# Patient Record
Sex: MALE | Race: ASIAN | NOT HISPANIC OR LATINO | Employment: FULL TIME | URBAN - METROPOLITAN AREA
[De-identification: names, ages, dates, MRNs, and addresses within clinical notes are randomized per-mention and may not be internally consistent; named-entity substitution may affect disease eponyms.]

---

## 2023-01-10 ENCOUNTER — OFFICE VISIT (OUTPATIENT)
Dept: PRIMARY CARE CLINIC | Facility: CLINIC | Age: 38
End: 2023-01-10
Payer: COMMERCIAL

## 2023-01-10 VITALS
OXYGEN SATURATION: 97 % | HEIGHT: 68 IN | DIASTOLIC BLOOD PRESSURE: 54 MMHG | RESPIRATION RATE: 16 BRPM | SYSTOLIC BLOOD PRESSURE: 104 MMHG | TEMPERATURE: 97 F | HEART RATE: 59 BPM | WEIGHT: 182.63 LBS | BODY MASS INDEX: 27.68 KG/M2

## 2023-01-10 DIAGNOSIS — R94.31 ABNORMAL EKG: ICD-10-CM

## 2023-01-10 DIAGNOSIS — Z11.4 ENCOUNTER FOR SCREENING FOR HIV: ICD-10-CM

## 2023-01-10 DIAGNOSIS — F41.1 GAD (GENERALIZED ANXIETY DISORDER): ICD-10-CM

## 2023-01-10 DIAGNOSIS — Z11.59 NEED FOR HEPATITIS C SCREENING TEST: ICD-10-CM

## 2023-01-10 DIAGNOSIS — Z83.3 FAMILY HISTORY OF DIABETES MELLITUS (DM): ICD-10-CM

## 2023-01-10 DIAGNOSIS — Z23 NEED FOR TD VACCINE: ICD-10-CM

## 2023-01-10 DIAGNOSIS — Z76.89 ENCOUNTER TO ESTABLISH CARE: Primary | ICD-10-CM

## 2023-01-10 DIAGNOSIS — Z13.220 SCREENING CHOLESTEROL LEVEL: ICD-10-CM

## 2023-01-10 DIAGNOSIS — J06.9 UPPER RESPIRATORY TRACT INFECTION, UNSPECIFIED TYPE: ICD-10-CM

## 2023-01-10 DIAGNOSIS — Z00.00 HEALTH CARE MAINTENANCE: ICD-10-CM

## 2023-01-10 DIAGNOSIS — Z23 FLU VACCINE NEED: ICD-10-CM

## 2023-01-10 PROCEDURE — 1160F PR REVIEW ALL MEDS BY PRESCRIBER/CLIN PHARMACIST DOCUMENTED: ICD-10-PCS | Mod: CPTII,S$GLB,, | Performed by: STUDENT IN AN ORGANIZED HEALTH CARE EDUCATION/TRAINING PROGRAM

## 2023-01-10 PROCEDURE — 3074F SYST BP LT 130 MM HG: CPT | Mod: CPTII,S$GLB,, | Performed by: STUDENT IN AN ORGANIZED HEALTH CARE EDUCATION/TRAINING PROGRAM

## 2023-01-10 PROCEDURE — 99999 PR PBB SHADOW E&M-EST. PATIENT-LVL V: CPT | Mod: PBBFAC,,, | Performed by: STUDENT IN AN ORGANIZED HEALTH CARE EDUCATION/TRAINING PROGRAM

## 2023-01-10 PROCEDURE — 99999 PR PBB SHADOW E&M-EST. PATIENT-LVL V: ICD-10-PCS | Mod: PBBFAC,,, | Performed by: STUDENT IN AN ORGANIZED HEALTH CARE EDUCATION/TRAINING PROGRAM

## 2023-01-10 PROCEDURE — 3074F PR MOST RECENT SYSTOLIC BLOOD PRESSURE < 130 MM HG: ICD-10-PCS | Mod: CPTII,S$GLB,, | Performed by: STUDENT IN AN ORGANIZED HEALTH CARE EDUCATION/TRAINING PROGRAM

## 2023-01-10 PROCEDURE — 1159F MED LIST DOCD IN RCRD: CPT | Mod: CPTII,S$GLB,, | Performed by: STUDENT IN AN ORGANIZED HEALTH CARE EDUCATION/TRAINING PROGRAM

## 2023-01-10 PROCEDURE — 1159F PR MEDICATION LIST DOCUMENTED IN MEDICAL RECORD: ICD-10-PCS | Mod: CPTII,S$GLB,, | Performed by: STUDENT IN AN ORGANIZED HEALTH CARE EDUCATION/TRAINING PROGRAM

## 2023-01-10 PROCEDURE — 99204 PR OFFICE/OUTPT VISIT, NEW, LEVL IV, 45-59 MIN: ICD-10-PCS | Mod: S$GLB,,, | Performed by: STUDENT IN AN ORGANIZED HEALTH CARE EDUCATION/TRAINING PROGRAM

## 2023-01-10 PROCEDURE — 3008F PR BODY MASS INDEX (BMI) DOCUMENTED: ICD-10-PCS | Mod: CPTII,S$GLB,, | Performed by: STUDENT IN AN ORGANIZED HEALTH CARE EDUCATION/TRAINING PROGRAM

## 2023-01-10 PROCEDURE — 3078F DIAST BP <80 MM HG: CPT | Mod: CPTII,S$GLB,, | Performed by: STUDENT IN AN ORGANIZED HEALTH CARE EDUCATION/TRAINING PROGRAM

## 2023-01-10 PROCEDURE — 1160F RVW MEDS BY RX/DR IN RCRD: CPT | Mod: CPTII,S$GLB,, | Performed by: STUDENT IN AN ORGANIZED HEALTH CARE EDUCATION/TRAINING PROGRAM

## 2023-01-10 PROCEDURE — 3078F PR MOST RECENT DIASTOLIC BLOOD PRESSURE < 80 MM HG: ICD-10-PCS | Mod: CPTII,S$GLB,, | Performed by: STUDENT IN AN ORGANIZED HEALTH CARE EDUCATION/TRAINING PROGRAM

## 2023-01-10 PROCEDURE — 99204 OFFICE O/P NEW MOD 45 MIN: CPT | Mod: S$GLB,,, | Performed by: STUDENT IN AN ORGANIZED HEALTH CARE EDUCATION/TRAINING PROGRAM

## 2023-01-10 PROCEDURE — 3008F BODY MASS INDEX DOCD: CPT | Mod: CPTII,S$GLB,, | Performed by: STUDENT IN AN ORGANIZED HEALTH CARE EDUCATION/TRAINING PROGRAM

## 2023-01-10 RX ORDER — AZITHROMYCIN 250 MG/1
TABLET, FILM COATED ORAL
Qty: 6 TABLET | Refills: 0 | Status: SHIPPED | OUTPATIENT
Start: 2023-01-10 | End: 2023-01-15

## 2023-01-10 NOTE — PATIENT INSTRUCTIONS
Establish care:   - 37-year-old male presenting to clinic today to establish care.  Has some history of mild tremor as well as anxiety.  Family history of diabetes and parkinsonism    URI:   - patient was initially not complaining of upper respiratory symptoms, however on exam was found to have redness in the left ear, had palpable lymph node in the left anterior chain in left posterior auricular chain.   - mild redness to oropharynx, no pustules.   - getting COVID and flu swabs completed.   - discussed that this is likely not bacterial in would not need antibiotics at this time.  But will give pocket prescription to use if symptoms get significantly worse in the next week.    General anxiety disorder:   - patient's SHERLEY score 7.   - does note some stress and anxiety at baseline.   - place a referral to psych/Psychology to get counseling.   - discussed resting, hydration, exercise and general wellness.    Abnormal EKG:   - patient has EKG today showing sinus bradycardia, and possible pericarditis.  Will review this and see if Echo is indicated after having phizer shots 3 times this year and some intermittent chest pains.    - will get Troponin and CRP.     Tremor:   - patient has mild tremor in hand, noted when watching closely, but is not apparent from a distance.   - most noted at rest.  Does suspected has some improvement with alcoholic use.   - wife notices while holding patient's hand.   - has not seem to get worse with exertion or activity.   - discussed relaxation, rest and hydration.   - could consider beta-blocker in the future if needed.    Family history:   - family history of parkinsonism, early stroke and diabetes.  Will get EKG, cholesterol, A1c.

## 2023-01-10 NOTE — PROGRESS NOTES
Subjective:           Patient ID: Jose Frost is a 37 y.o. male who presents today with a chief complaint of est care.    Chief Complaint:   Establish Care and Annual Exam      History of Present Illness:    37-year-old male presenting to establish care.  Works at  at [employer].      Has tremor in his hands, has been for at least 10 years.    Has seen that a drink can possibly help with tremor.     Has had a thyroid panel was normal in [place].     Has some increased anxiety at times.    Has some snoring at night.   Has difficulty breathing through the nose, mouth breathing at night.     GAD7 was at a 7.    Is interested in counseling.     Review of Systems   Constitutional: Negative.  Negative for chills, fatigue, fever and unexpected weight change.   HENT: Negative.  Negative for congestion, postnasal drip, rhinorrhea and trouble swallowing.    Eyes: Negative.    Respiratory: Negative.  Negative for cough, chest tightness, shortness of breath and wheezing.    Cardiovascular: Negative.  Negative for chest pain, palpitations and leg swelling.   Gastrointestinal: Negative.  Negative for constipation, diarrhea, nausea and vomiting.   Endocrine: Negative.    Genitourinary: Negative.  Negative for difficulty urinating, frequency and urgency.   Musculoskeletal: Negative.  Negative for arthralgias, back pain and gait problem.   Skin: Negative.  Negative for rash and wound.   Allergic/Immunologic: Negative for food allergies.   Neurological:  Positive for tremors. Negative for headaches.   Psychiatric/Behavioral:  Positive for sleep disturbance (snoring). Negative for behavioral problems and decreased concentration. The patient is nervous/anxious.          Objective:        Vitals:    01/10/23 1341 01/10/23 1431   BP: (!) 100/58 (!) 104/54   BP Location: Right arm Right arm   Patient Position: Sitting Sitting   BP Method: Medium (Manual) Medium (Manual)   Pulse: (!) 59    Resp: 16    Temp: 97.2  "°F (36.2 °C)    TempSrc: Temporal    SpO2: 97%    Weight: 82.9 kg (182 lb 10.4 oz)    Height: 5' 8" (1.727 m)        Body mass index is 27.77 kg/m².      Physical Exam  Vitals reviewed.   Constitutional:       General: He is not in acute distress.     Appearance: Normal appearance. He is not ill-appearing.      Comments: As per BMI.   HENT:      Head: Normocephalic and atraumatic.      Right Ear: External ear normal.      Left Ear: External ear normal.      Ears:      Comments: Redness in left ear canal.     Nose: Nose normal. No rhinorrhea.      Mouth/Throat:      Pharynx: Posterior oropharyngeal erythema (mild) present. No oropharyngeal exudate.   Eyes:      Extraocular Movements: Extraocular movements intact.      Conjunctiva/sclera: Conjunctivae normal.   Cardiovascular:      Rate and Rhythm: Regular rhythm. Bradycardia present.      Heart sounds: No murmur heard.  Pulmonary:      Effort: Pulmonary effort is normal. No respiratory distress.      Breath sounds: No wheezing.   Abdominal:      Tenderness: There is no right CVA tenderness or left CVA tenderness.   Musculoskeletal:         General: No swelling or deformity.      Cervical back: Normal range of motion.      Right lower leg: No edema.      Left lower leg: No edema.   Lymphadenopathy:      Cervical: Cervical adenopathy present.   Skin:     Capillary Refill: Capillary refill takes less than 2 seconds.      Findings: No bruising or lesion.   Neurological:      General: No focal deficit present.      Mental Status: He is alert and oriented to person, place, and time.      Gait: Gait normal.      Comments: Mild tremor at rest. No cog wheeling.    Psychiatric:         Thought Content: Thought content normal.         Judgment: Judgment normal.      Comments: Some anxiety voiced.           No results found for: NA, K, CL, CO2, BUN, CREATININE, GLUCOSE, ANIONGAP  No results found for: HGBA1C  No results found for: BNP, BNPTRIAGEBLO    No results found for: WBC, " HGB, HCT, PLT, GRAN  No results found for: CHOL, HDL, LDLCALC, TRIG       Current Outpatient Medications:     azithromycin (Z-SCOTT) 250 MG tablet, Take 2 tablets by mouth on day 1; Take 1 tablet by mouth on days 2-5, Disp: 6 tablet, Rfl: 0     Outpatient Encounter Medications as of 1/10/2023   Medication Sig Dispense Refill    azithromycin (Z-SCOTT) 250 MG tablet Take 2 tablets by mouth on day 1; Take 1 tablet by mouth on days 2-5 6 tablet 0     No facility-administered encounter medications on file as of 1/10/2023.          Assessment:       1. Encounter to establish care    2. Family history of diabetes mellitus (DM)    3. SHERLEY (generalized anxiety disorder)    4. Health care maintenance    5. Need for Td vaccine    6. Flu vaccine need    7. Need for hepatitis C screening test    8. Encounter for screening for HIV    9. Screening cholesterol level    10. Upper respiratory tract infection, unspecified type           Plan:       Encounter to establish care    Family history of diabetes mellitus (DM)  -     Hemoglobin A1C; Future; Expected date: 01/10/2023    SHERLEY (generalized anxiety disorder)  -     Ambulatory referral/consult to Psychology; Future; Expected date: 01/17/2023  -     Ambulatory referral/consult to Psychiatry; Future; Expected date: 01/17/2023    Health care maintenance  -     CBC Auto Differential; Future; Expected date: 01/10/2023  -     Comprehensive Metabolic Panel; Future; Expected date: 01/10/2023  -     Lipid Panel; Future; Expected date: 01/10/2023  -     TSH; Future; Expected date: 01/10/2023  -     T4, Free; Future; Expected date: 01/10/2023    Need for Td vaccine  -     Cancel: (In Office Administered) Td Vaccine - Preservative Free    Flu vaccine need  -     Cancel: Influenza - Quadrivalent *Preferred* (6 months+) (PF)    Need for hepatitis C screening test  -     HEPATITIS C ANTIBODY; Future; Expected date: 01/10/2023    Encounter for screening for HIV  -     HIV 1/2 Ag/Ab (4th Gen); Future;  Expected date: 01/10/2023    Screening cholesterol level    Upper respiratory tract infection, unspecified type  -     POCT COVID-19 Rapid Screening  -     POCT Influenza A/B Molecular  -     azithromycin (Z-SCOTT) 250 MG tablet; Take 2 tablets by mouth on day 1; Take 1 tablet by mouth on days 2-5  Dispense: 6 tablet; Refill: 0                 Establish care:   - 37-year-old male presenting to clinic today to establish care.  Has some history of mild tremor as well as anxiety.  Family history of diabetes and parkinsonism    URI:   - patient was initially not complaining of upper respiratory symptoms, however on exam was found to have redness in the left ear, had palpable lymph node in the left anterior chain in left posterior auricular chain.   - mild redness to oropharynx, no pustules.   - getting COVID and flu swabs completed.   - discussed that this is likely not bacterial in would not need antibiotics at this time.  But will give pocket prescription to use if symptoms get significantly worse in the next week.    General anxiety disorder:   - patient's SHERLEY score 7.   - does note some stress and anxiety at baseline.   - place a referral to psych/Psychology to get counseling.   - discussed resting, hydration, exercise and general wellness.    Abnormal EKG:   - patient has EKG today showing sinus bradycardia, and possible pericarditis.  Will review this and see if Echo is indicated after having phizer shots 3 times this year and some intermittent chest pains.    - will get Troponin and CRP.     Tremor:   - patient has mild tremor in hand, noted when watching closely, but is not apparent from a distance.   - most noted at rest.  Does suspected has some improvement with alcoholic use.   - wife notices while holding patient's hand.   - has not seem to get worse with exertion or activity.   - discussed relaxation, rest and hydration.   - could consider beta-blocker in the future if needed.    Family history:   - family  history of parkinsonism, early stroke and diabetes.  Will get EKG, cholesterol, A1c.

## 2023-01-11 ENCOUNTER — PATIENT MESSAGE (OUTPATIENT)
Dept: DIABETES | Facility: CLINIC | Age: 38
End: 2023-01-11
Payer: COMMERCIAL

## 2023-01-17 ENCOUNTER — LAB VISIT (OUTPATIENT)
Dept: LAB | Facility: HOSPITAL | Age: 38
End: 2023-01-17
Attending: STUDENT IN AN ORGANIZED HEALTH CARE EDUCATION/TRAINING PROGRAM
Payer: COMMERCIAL

## 2023-01-17 DIAGNOSIS — Z00.00 HEALTH CARE MAINTENANCE: ICD-10-CM

## 2023-01-17 DIAGNOSIS — R94.31 ABNORMAL EKG: ICD-10-CM

## 2023-01-17 DIAGNOSIS — Z11.4 ENCOUNTER FOR SCREENING FOR HIV: ICD-10-CM

## 2023-01-17 DIAGNOSIS — Z83.3 FAMILY HISTORY OF DIABETES MELLITUS (DM): ICD-10-CM

## 2023-01-17 DIAGNOSIS — Z11.59 NEED FOR HEPATITIS C SCREENING TEST: ICD-10-CM

## 2023-01-17 LAB
ALBUMIN SERPL BCP-MCNC: 4.4 G/DL (ref 3.5–5.2)
ALP SERPL-CCNC: 70 U/L (ref 55–135)
ALT SERPL W/O P-5'-P-CCNC: 23 U/L (ref 10–44)
ANION GAP SERPL CALC-SCNC: 9 MMOL/L (ref 8–16)
AST SERPL-CCNC: 26 U/L (ref 10–40)
BASOPHILS # BLD AUTO: 0.05 K/UL (ref 0–0.2)
BASOPHILS NFR BLD: 0.9 % (ref 0–1.9)
BILIRUB SERPL-MCNC: 0.6 MG/DL (ref 0.1–1)
BUN SERPL-MCNC: 18 MG/DL (ref 6–20)
CALCIUM SERPL-MCNC: 10.3 MG/DL (ref 8.7–10.5)
CHLORIDE SERPL-SCNC: 103 MMOL/L (ref 95–110)
CHOLEST SERPL-MCNC: 207 MG/DL (ref 120–199)
CHOLEST/HDLC SERPL: 3.9 {RATIO} (ref 2–5)
CO2 SERPL-SCNC: 25 MMOL/L (ref 23–29)
CREAT SERPL-MCNC: 0.9 MG/DL (ref 0.5–1.4)
CRP SERPL-MCNC: 5.3 MG/L (ref 0–8.2)
DIFFERENTIAL METHOD: ABNORMAL
EOSINOPHIL # BLD AUTO: 0.1 K/UL (ref 0–0.5)
EOSINOPHIL NFR BLD: 2 % (ref 0–8)
ERYTHROCYTE [DISTWIDTH] IN BLOOD BY AUTOMATED COUNT: 13.1 % (ref 11.5–14.5)
EST. GFR  (NO RACE VARIABLE): >60 ML/MIN/1.73 M^2
ESTIMATED AVG GLUCOSE: 108 MG/DL (ref 68–131)
GLUCOSE SERPL-MCNC: 82 MG/DL (ref 70–110)
HBA1C MFR BLD: 5.4 % (ref 4–5.6)
HCT VFR BLD AUTO: 42.8 % (ref 40–54)
HCV AB SERPL QL IA: NORMAL
HDLC SERPL-MCNC: 53 MG/DL (ref 40–75)
HDLC SERPL: 25.6 % (ref 20–50)
HGB BLD-MCNC: 13.3 G/DL (ref 14–18)
HIV 1+2 AB+HIV1 P24 AG SERPL QL IA: NORMAL
IMM GRANULOCYTES # BLD AUTO: 0.01 K/UL (ref 0–0.04)
IMM GRANULOCYTES NFR BLD AUTO: 0.2 % (ref 0–0.5)
LDLC SERPL CALC-MCNC: 141.6 MG/DL (ref 63–159)
LYMPHOCYTES # BLD AUTO: 1.7 K/UL (ref 1–4.8)
LYMPHOCYTES NFR BLD: 31.9 % (ref 18–48)
MCH RBC QN AUTO: 27 PG (ref 27–31)
MCHC RBC AUTO-ENTMCNC: 31.1 G/DL (ref 32–36)
MCV RBC AUTO: 87 FL (ref 82–98)
MONOCYTES # BLD AUTO: 0.4 K/UL (ref 0.3–1)
MONOCYTES NFR BLD: 6.9 % (ref 4–15)
NEUTROPHILS # BLD AUTO: 3.1 K/UL (ref 1.8–7.7)
NEUTROPHILS NFR BLD: 58.1 % (ref 38–73)
NONHDLC SERPL-MCNC: 154 MG/DL
NRBC BLD-RTO: 0 /100 WBC
PLATELET # BLD AUTO: 347 K/UL (ref 150–450)
PMV BLD AUTO: 10.5 FL (ref 9.2–12.9)
POTASSIUM SERPL-SCNC: 4.9 MMOL/L (ref 3.5–5.1)
PROT SERPL-MCNC: 8.1 G/DL (ref 6–8.4)
RBC # BLD AUTO: 4.93 M/UL (ref 4.6–6.2)
SODIUM SERPL-SCNC: 137 MMOL/L (ref 136–145)
T4 FREE SERPL-MCNC: 1.03 NG/DL (ref 0.71–1.51)
TRIGL SERPL-MCNC: 62 MG/DL (ref 30–150)
TROPONIN I SERPL DL<=0.01 NG/ML-MCNC: <0.006 NG/ML (ref 0–0.03)
TSH SERPL DL<=0.005 MIU/L-ACNC: 1.16 UIU/ML (ref 0.4–4)
WBC # BLD AUTO: 5.4 K/UL (ref 3.9–12.7)

## 2023-01-17 PROCEDURE — 86140 C-REACTIVE PROTEIN: CPT | Performed by: STUDENT IN AN ORGANIZED HEALTH CARE EDUCATION/TRAINING PROGRAM

## 2023-01-17 PROCEDURE — 87389 HIV-1 AG W/HIV-1&-2 AB AG IA: CPT | Performed by: STUDENT IN AN ORGANIZED HEALTH CARE EDUCATION/TRAINING PROGRAM

## 2023-01-17 PROCEDURE — 84443 ASSAY THYROID STIM HORMONE: CPT | Performed by: STUDENT IN AN ORGANIZED HEALTH CARE EDUCATION/TRAINING PROGRAM

## 2023-01-17 PROCEDURE — 84484 ASSAY OF TROPONIN QUANT: CPT | Performed by: STUDENT IN AN ORGANIZED HEALTH CARE EDUCATION/TRAINING PROGRAM

## 2023-01-17 PROCEDURE — 85025 COMPLETE CBC W/AUTO DIFF WBC: CPT | Performed by: STUDENT IN AN ORGANIZED HEALTH CARE EDUCATION/TRAINING PROGRAM

## 2023-01-17 PROCEDURE — 84439 ASSAY OF FREE THYROXINE: CPT | Performed by: STUDENT IN AN ORGANIZED HEALTH CARE EDUCATION/TRAINING PROGRAM

## 2023-01-17 PROCEDURE — 86803 HEPATITIS C AB TEST: CPT | Performed by: STUDENT IN AN ORGANIZED HEALTH CARE EDUCATION/TRAINING PROGRAM

## 2023-01-17 PROCEDURE — 80053 COMPREHEN METABOLIC PANEL: CPT | Performed by: STUDENT IN AN ORGANIZED HEALTH CARE EDUCATION/TRAINING PROGRAM

## 2023-01-17 PROCEDURE — 83036 HEMOGLOBIN GLYCOSYLATED A1C: CPT | Performed by: STUDENT IN AN ORGANIZED HEALTH CARE EDUCATION/TRAINING PROGRAM

## 2023-01-17 PROCEDURE — 80061 LIPID PANEL: CPT | Performed by: STUDENT IN AN ORGANIZED HEALTH CARE EDUCATION/TRAINING PROGRAM

## 2023-01-17 PROCEDURE — 36415 COLL VENOUS BLD VENIPUNCTURE: CPT | Performed by: STUDENT IN AN ORGANIZED HEALTH CARE EDUCATION/TRAINING PROGRAM

## 2023-01-21 ENCOUNTER — PATIENT MESSAGE (OUTPATIENT)
Dept: PRIMARY CARE CLINIC | Facility: CLINIC | Age: 38
End: 2023-01-21
Payer: COMMERCIAL

## 2024-04-15 ENCOUNTER — OFFICE VISIT (OUTPATIENT)
Dept: SPORTS MEDICINE | Facility: CLINIC | Age: 39
End: 2024-04-15
Payer: COMMERCIAL

## 2024-04-15 ENCOUNTER — HOSPITAL ENCOUNTER (OUTPATIENT)
Dept: RADIOLOGY | Facility: HOSPITAL | Age: 39
Discharge: HOME | End: 2024-04-15
Attending: ORTHOPAEDIC SURGERY
Payer: COMMERCIAL

## 2024-04-15 VITALS
BODY MASS INDEX: 27.7 KG/M2 | WEIGHT: 182.75 LBS | HEART RATE: 58 BPM | HEIGHT: 68 IN | DIASTOLIC BLOOD PRESSURE: 68 MMHG | SYSTOLIC BLOOD PRESSURE: 112 MMHG

## 2024-04-15 DIAGNOSIS — M25.562 LEFT KNEE PAIN, UNSPECIFIED CHRONICITY: Primary | ICD-10-CM

## 2024-04-15 DIAGNOSIS — M25.562 LEFT KNEE PAIN, UNSPECIFIED CHRONICITY: ICD-10-CM

## 2024-04-15 PROCEDURE — 99999 PR PBB SHADOW E&M-EST. PATIENT-LVL III: CPT | Mod: PBBFAC,,, | Performed by: ORTHOPAEDIC SURGERY

## 2024-04-15 PROCEDURE — 99204 OFFICE O/P NEW MOD 45 MIN: CPT | Mod: S$GLB,,, | Performed by: ORTHOPAEDIC SURGERY

## 2024-04-15 PROCEDURE — 3074F SYST BP LT 130 MM HG: CPT | Mod: CPTII,S$GLB,, | Performed by: ORTHOPAEDIC SURGERY

## 2024-04-15 PROCEDURE — 3078F DIAST BP <80 MM HG: CPT | Mod: CPTII,S$GLB,, | Performed by: ORTHOPAEDIC SURGERY

## 2024-04-15 PROCEDURE — 3008F BODY MASS INDEX DOCD: CPT | Mod: CPTII,S$GLB,, | Performed by: ORTHOPAEDIC SURGERY

## 2024-04-15 PROCEDURE — 73564 X-RAY EXAM KNEE 4 OR MORE: CPT | Mod: 26,,, | Performed by: RADIOLOGY

## 2024-04-15 PROCEDURE — 1159F MED LIST DOCD IN RCRD: CPT | Mod: CPTII,S$GLB,, | Performed by: ORTHOPAEDIC SURGERY

## 2024-04-15 PROCEDURE — 73564 X-RAY EXAM KNEE 4 OR MORE: CPT | Mod: TC,50

## 2024-04-15 NOTE — PROGRESS NOTES
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 His left knee initially began hurting him in Dec 2023 when he was trying to sitand up from sitting in a figure 4 position. No discrete injury. He is part of a tennis league and in February was playing in a match and afterwards he noticed that his left knee was sore. Denies swelling but does have occasional clicking and popping in the knee, mostly while playing tennis. Most of his pain is located medially adjacent to the patella tendon. He has been treating his pain with occasional NSAIDs.     + mechanical symptoms, no instability    Is affecting ADLs.      SANE: 50  VAS 1/10    Review of Systems   Constitution: Negative. Negative for chills, fever and night sweats.   HENT: Negative for congestion and headaches.    Eyes: Negative for blurred vision, left vision loss and right vision loss.   Cardiovascular: Negative for chest pain and syncope.   Respiratory: Negative for cough and shortness of breath.    Endocrine: Negative for polydipsia, polyphagia and polyuria.   Hematologic/Lymphatic: Negative for bleeding problem. Does not bruise/bleed easily.   Skin: Negative for dry skin, itching and rash.   Musculoskeletal: Negative for falls. Positive for knee pain and muscle weakness.   Gastrointestinal: Negative for abdominal pain and bowel incontinence.   Genitourinary: Negative for bladder incontinence and nocturia.   Neurological: Negative for disturbances in coordination, loss of balance and seizures.   Psychiatric/Behavioral: Negative for depression. The patient does not have insomnia.    Allergic/Immunologic: Negative for hives and persistent infections.     PAST MEDICAL HISTORY:   Past Medical History:   Diagnosis Date    Mild intermittent asthma, uncomplicated      PAST SURGICAL HISTORY:   Past Surgical History:   Procedure Laterality Date     "TONSILLECTOMY  1995     FAMILY HISTORY:   Family History   Problem Relation Name Age of Onset    Asthma Mother Tanika Frost     Hypertension Mother Tanika Frost     Early death Father Aaron Frost         due to Hemorrhagic Stroke at 42.    Stroke Father Aaron Frost         Passed away from Hemorrhagic stroke at 42.    Heart attack Maternal Grandfather      Parkinsonism Maternal Uncle      Lung cancer Neg Hx      Colon cancer Neg Hx      Prostate cancer Neg Hx       SOCIAL HISTORY:   Social History     Socioeconomic History    Marital status:    Tobacco Use    Smoking status: Never    Smokeless tobacco: Never   Substance and Sexual Activity    Alcohol use: Yes     Comment: about 2 drink in an average week    Drug use: Never    Sexual activity: Yes     Partners: Female     Birth control/protection: Condom       MEDICATIONS: No current outpatient medications on file.  ALLERGIES: Review of patient's allergies indicates:  No Known Allergies    VITAL SIGNS: /68   Pulse (!) 58   Ht 5' 8" (1.727 m)   Wt 82.9 kg (182 lb 12.2 oz)   BMI 27.79 kg/m²      PHYSICAL EXAMINATION  VITAL SIGNS: /68   Pulse (!) 58   Ht 5' 8" (1.727 m)   Wt 82.9 kg (182 lb 12.2 oz)   BMI 27.79 kg/m²    General:  The patient is alert and oriented x 3.  Mood is pleasant.  Observation of ears, eyes and nose reveal no gross abnormalities.  HEENT: NCAT, sclera nonicteric  Lungs: Respirations are equal and unlabored.    Left KNEE EXAMINATION     OBSERVATION / INSPECTION   Gait:   Nonantalgic   Alignment:  Neutral   Scars:   None   Muscle atrophy: Mild  Effusion:  None   Warmth:  None   Discoloration:   none     TENDERNESS / CREPITUS (T / C):          T / C      T / C   Patella   - / -   Lateral joint line   - / -    Peripatellar medial  -  Medial joint line    + / +    Peripatellar lateral -  Medial plica   - / -    Patellar tendon -   Popliteal fossa  - / -    Quad tendon   - "   Gastrocnemius   -   Prepatellar Bursa - / -   Quadricep   -   Tibial tubercle  -  Thigh/hamstring  -   Pes anserine/HS -  Fibula    -   ITB   - / -  Tibia     -   Tib/fib joint  - / -  LCL    -     MFC   - / -   MCL: Proximal  -    LFC   - / -    Distal   -          ROM: (* = pain)  PASSIVE   ACTIVE    Left :   5 / 0 / 145   5 / 0 / 145     Right :    5 / 0 / 145   5 / 0 / 145    PATELLOFEMORAL EXAMINATION:  See above noted areas of tenderness.   Patella position    Subluxation / dislocation: Centered           Sup. / Inf;   Normal   Crepitus (PF):    Absent   Patellar Mobility:       Medial-lateral:   Normal    Superior-inferior:  Normal    Inferior tilt   Normal    Patellar tendon:  Normal   Lateral tilt:    Normal   J-sign:     None   Patellofemoral grind:   No pain       MENISCAL SIGNS:     Pain on terminal extension:  +  Pain on terminal flexion:  +  Blairs maneuver:  + for pain  Squat     + medial joint pain    LIGAMENT EXAMINATION:  ACL / Lachman:  normal (-1 to 2mm)    PCL-Post.  drawer: normal 0 to 2mm  MCL- Valgus:  normal 0 to 2mm  LCL- Varus:  normal 0 to 2mm  Pivot shift: normal (Equal)   Dial Test: difference c/w other side   At 30° flexion: normal (< 5°)    At 90° flexion: normal (< 5°)   Reverse Pivot Shift:   normal (Equal)     STRENGTH: (* = with pain) PAINFUL SIDE   Quadricep   5/5   Hamstrin/5    EXTREMITY NEURO-VASCULAR EXAMINATION:   Sensation:  Grossly intact to light touch all dermatomal regions.   Motor Function:  Fully intact motor function at hip, knee, foot and ankle    DTRs;  quadriceps and  achilles 2+.  No clonus and downgoing Babinski.    Vascular status:  DP and PT pulses 2+, brisk capillary refill, symmetric.     Other Findings:       X-rays reviewed and interpreted personally by me:  including standing, weight bearing AP and flexion bilateral knees, lateral and merchant views ordered and images reviewed by me show:  No fracture, dislocation     ASSESSMENT:    Left  Knee  Probable Meniscus tear  medial       PLAN:   MRI Left knee  Hold out of sports until MRI  Will contact once MRI completed    All questions were answered, pt will contact us for questions or concerns in the interim.    I made the decision to obtain old records of the patient including previous notes and imaging. New imaging was ordered today of the extremity or extremities evaluated. I independently reviewed and interpreted the radiographs and/or MRIs today as well as prior imaging.

## 2024-04-17 ENCOUNTER — TELEPHONE (OUTPATIENT)
Dept: SPORTS MEDICINE | Facility: CLINIC | Age: 39
End: 2024-04-17
Payer: COMMERCIAL

## 2024-04-17 ENCOUNTER — HOSPITAL ENCOUNTER (OUTPATIENT)
Dept: RADIOLOGY | Facility: HOSPITAL | Age: 39
Discharge: HOME | End: 2024-04-17
Attending: STUDENT IN AN ORGANIZED HEALTH CARE EDUCATION/TRAINING PROGRAM
Payer: COMMERCIAL

## 2024-04-17 DIAGNOSIS — M25.562 LEFT KNEE PAIN, UNSPECIFIED CHRONICITY: ICD-10-CM

## 2024-04-17 PROCEDURE — 73721 MRI JNT OF LWR EXTRE W/O DYE: CPT | Mod: 26,LT,, | Performed by: RADIOLOGY

## 2024-04-17 PROCEDURE — 73721 MRI JNT OF LWR EXTRE W/O DYE: CPT | Mod: TC,LT

## 2024-04-17 NOTE — TELEPHONE ENCOUNTER
Spoke with patients wife via telephone today regarding left knee MRI results and treatment moving forward.     Left knee MRI shows complex tear posterior horn medial meniscus     he would benefit from knee arthroscopy, possible plica excision, possible chondroplasty with possible meniscectomy given the above.     PLAN: We have discussed the surgery and recovery of arthroscopic knee surgery. he understands that there may be limited weightbearing up to several weeks after surgery depending on procedures that are performed at the time of surgery.    The spectrum of treatment options were discussed with the patient, including nonoperative and operative options.  After thorough discussion, the patient has elected to undergo surgical treatment to include:  left   a. Knee arthroscopic medial meniscectomy  possible meniscus repair   b. Knee arthroscopic possible plica excision   c. Knee arthroscopic possible chondroplasty    The details of the surgical procedure were explained, including the location of probable incisions and a description of likely hardware and/or grafts to be used.  The patient understands the likely convalescence after surgery.  Also, we have thoroughly discussed the risks, benefits and alternatives to surgery, including, but not limited to, the risk of infection, joint stiffness, blood clot (including DVT and/or pulmonary embolus), neurologic and vascular injury.  It was explained that, if tissue has been repaired or reconstructed, there is a chance of failure, which may require further management.       Patient will contact us via the portal once he decides on a date     Discussed with Dr. Rachael Bunch M.D.  Orthopedic Sports Medicine Fellow     
No significant past surgical history

## 2024-04-18 ENCOUNTER — PATIENT MESSAGE (OUTPATIENT)
Dept: SPORTS MEDICINE | Facility: CLINIC | Age: 39
End: 2024-04-18
Payer: COMMERCIAL

## 2024-04-23 DIAGNOSIS — M94.262 CHONDROMALACIA OF LEFT KNEE: ICD-10-CM

## 2024-04-23 DIAGNOSIS — S83.242A TEAR OF MEDIAL MENISCUS OF LEFT KNEE: Primary | ICD-10-CM

## 2024-04-23 DIAGNOSIS — M67.52 PLICA SYNDROME OF LEFT KNEE: ICD-10-CM

## 2024-06-03 ENCOUNTER — OFFICE VISIT (OUTPATIENT)
Dept: SPORTS MEDICINE | Facility: CLINIC | Age: 39
End: 2024-06-03
Payer: COMMERCIAL

## 2024-06-03 VITALS
SYSTOLIC BLOOD PRESSURE: 120 MMHG | WEIGHT: 180.75 LBS | HEART RATE: 58 BPM | BODY MASS INDEX: 27.49 KG/M2 | DIASTOLIC BLOOD PRESSURE: 72 MMHG

## 2024-06-03 DIAGNOSIS — S83.232D COMPLEX TEAR OF MEDIAL MENISCUS OF LEFT KNEE AS CURRENT INJURY, SUBSEQUENT ENCOUNTER: Primary | ICD-10-CM

## 2024-06-03 PROCEDURE — 99999 PR PBB SHADOW E&M-EST. PATIENT-LVL III: CPT | Mod: PBBFAC,,, | Performed by: PHYSICIAN ASSISTANT

## 2024-06-03 PROCEDURE — 99499 UNLISTED E&M SERVICE: CPT | Mod: S$GLB,,, | Performed by: PHYSICIAN ASSISTANT

## 2024-06-04 ENCOUNTER — PATIENT MESSAGE (OUTPATIENT)
Dept: PREADMISSION TESTING | Facility: HOSPITAL | Age: 39
End: 2024-06-04
Payer: COMMERCIAL

## 2024-06-04 RX ORDER — CEFAZOLIN SODIUM 2 G/50ML
2 SOLUTION INTRAVENOUS
Status: CANCELLED | OUTPATIENT
Start: 2024-06-04

## 2024-06-04 RX ORDER — ASPIRIN 81 MG/1
81 TABLET ORAL DAILY
Qty: 28 TABLET | Refills: 0 | Status: SHIPPED | OUTPATIENT
Start: 2024-06-04 | End: 2025-06-04

## 2024-06-04 RX ORDER — HYDROCODONE BITARTRATE AND ACETAMINOPHEN 10; 325 MG/1; MG/1
TABLET ORAL
Qty: 12 TABLET | Refills: 0 | Status: SHIPPED | OUTPATIENT
Start: 2024-06-04

## 2024-06-04 RX ORDER — TRAMADOL HYDROCHLORIDE 50 MG/1
50-100 TABLET ORAL EVERY 6 HOURS PRN
Qty: 21 TABLET | Refills: 0 | Status: SHIPPED | OUTPATIENT
Start: 2024-06-04

## 2024-06-04 RX ORDER — SODIUM CHLORIDE 9 MG/ML
INJECTION, SOLUTION INTRAVENOUS CONTINUOUS
Status: CANCELLED | OUTPATIENT
Start: 2024-06-04

## 2024-06-04 RX ORDER — PROMETHAZINE HYDROCHLORIDE 25 MG/1
25 TABLET ORAL EVERY 6 HOURS PRN
Qty: 8 TABLET | Refills: 0 | Status: SHIPPED | OUTPATIENT
Start: 2024-06-04

## 2024-06-04 NOTE — ANESTHESIA PAT ROS NOTE
06/04/2024  Jose Frost is a 39 y.o., male.      Pre-op Assessment    I have reviewed the Patient Summary Reports.       I have reviewed the Medications.     Review of Systems  Anesthesia Hx:  No problems with previous Anesthesia               Denies Personal Hx of Anesthesia complications.                    Social:  Non-Smoker, Social Alcohol Use       Hematology/Oncology:  Hematology Normal   Oncology Normal                                   EENT/Dental:   H/O Tonsillectomy          Cardiovascular:  Cardiovascular Normal Exercise tolerance: good       Denies CAD.       Denies Angina.       Denies APARICIO.  ECG has been reviewed. Elevated cholesterol                         Pulmonary:    Asthma mild  Denies Shortness of breath.   Mild intermittent asthma, uncomplicated, no medications,  snores               Renal/:  Renal/ Normal  Denies Chronic Renal Disease.                Hepatic/GI:  Hepatic/GI Normal     Denies GERD. Denies Liver Disease.            Musculoskeletal:     Tear of medial meniscus of left knee,  Plica syndrome of left knee,  Chondromalacia of left knee              Neurological:    Denies CVA.    Denies Headaches. Denies Seizures.    Hand tremor                            Endocrine:  Endocrine Normal Denies Diabetes. Denies Hypothyroidism.  Family history of diabetes mellitus (DM)        Psych:   anxiety  SHERLEY (generalized anxiety disorder)              Past Medical History:   Diagnosis Date    Mild intermittent asthma, uncomplicated      Past Surgical History:   Procedure Laterality Date    TONSILLECTOMY  1995       Anesthesia Assessment: Preoperative EQUATION    Planned Procedure: Procedure(s) (LRB):  ARTHROSCOPY, KNEE, WITH MENISCECTOMY (Left)  CHONDROPLASTY, KNEE (Left)  EXCISION, PLICA, KNEE, ARTHROSCOPIC (Left)  Requested Anesthesia Type:General  Surgeon: Ivelsise Cano,  MD  Service: Orthopedics  Known or anticipated Date of Surgery:6/6/2024    Surgeon notes: reviewed    Electronic QUestionnaire Assessment completed via nurse interview with patient.        Triage considerations:     The patient has no apparent active cardiac condition (No unstable coronary Syndrome such as severe unstable angina or recent [<1 month] myocardial infarction, decompensated CHF, severe valvular   disease or significant arrhythmia)    Previous anesthesia records: No problems, Not available    [facility]   Subspecialty notes: n/a    Other important co-morbidities:  No co-morbidities noted        EKG 1/10/2023: (in [facility])  Sinus bradycardia, vent. rate 47 bpm  ST elevation, consider early repolarization, pericarditis, or injury  Minimal voltage criteria for LVH, may be normal variant  Nonspecific ST abnormality  Abnormal ECG  No previous ECG's available       Tests already available:  Results have been reviewed.             Instructions given. (See in Nurse's note)    Optimization:  Anesthesia Preop Clinic Assessment Not Indicated    Medical Opinion Indicated: No       Sub-specialist consult indicated: No      Plan:  Consultation: Medical clearance is not requested.    Patient  has previously scheduled Medical Appointment: 1/10/2023    Navigation:  Straight Line to surgery.               No tests, anesthesia preop clinic visit, or consult required.                        Patient is OK to proceed with surgery at [facility].       Ht: 5'8  Wt: 82 kg (180 lb)  BMI: 27.49  Vaccinated

## 2024-06-04 NOTE — H&P (VIEW-ONLY)
Jose Frost  is here for a completion of his perioperative paperwork. he  Is scheduled to undergo     left              a. Knee arthroscopic medial meniscectomy  possible meniscus repair              b. Knee arthroscopic possible plica excision              c. Knee arthroscopic possible chondroplasty on 6/6/24.      He is a healthy individual and does not need clearance for this procedure.     PAST MEDICAL HISTORY:   Past Medical History:   Diagnosis Date    Mild intermittent asthma, uncomplicated      PAST SURGICAL HISTORY:   Past Surgical History:   Procedure Laterality Date    TONSILLECTOMY  1995     FAMILY HISTORY:   Family History   Problem Relation Name Age of Onset    Asthma Mother Tanika Frost     Hypertension Mother Tanika Frost     Early death Father Aaron Frost         due to Hemorrhagic Stroke at 42.    Stroke Father Aaron Frost         Passed away from Hemorrhagic stroke at 42.    Heart attack Maternal Grandfather      Parkinsonism Maternal Uncle      Lung cancer Neg Hx      Colon cancer Neg Hx      Prostate cancer Neg Hx       SOCIAL HISTORY:   Social History     Socioeconomic History    Marital status:    Tobacco Use    Smoking status: Never    Smokeless tobacco: Never   Substance and Sexual Activity    Alcohol use: Yes     Comment: about 2 drink in an average week    Drug use: Never    Sexual activity: Yes     Partners: Female     Birth control/protection: Condom       MEDICATIONS:   Current Outpatient Medications:     aspirin (ECOTRIN) 81 MG EC tablet, Take 1 tablet (81 mg total) by mouth once daily. For 4 weeks starting the day after surgery., Disp: 28 tablet, Rfl: 0    HYDROcodone-acetaminophen (NORCO)  mg per tablet, Take 1 tablet by mouth every 4-6 hours for pain., Disp: 12 tablet, Rfl: 0    promethazine (PHENERGAN) 25 MG tablet, Take 1 tablet (25 mg total) by mouth every 6 (six) hours as needed for Nausea., Disp: 8 tablet, Rfl:  0    traMADoL (ULTRAM) 50 mg tablet, Take 1-2 tablets ( mg total) by mouth every 6 (six) hours as needed for Pain., Disp: 21 tablet, Rfl: 0  ALLERGIES: Review of patient's allergies indicates:  No Known Allergies    VITAL SIGNS: /72   Pulse (!) 58   Wt 82 kg (180 lb 12.4 oz)   BMI 27.49 kg/m²      Risks, indications and benefits of the surgical procedure were discussed with the patient. All questions with regard to surgery, rehab, expected return to functional activities, activities of daily living and recreational endeavors were answered to his satisfaction.    It was explained to the patient that there may be an increase in surgical risks if the patient has certain co-morbidities such as but not limited to: Obesity, Cardiovascular issues (CHF, CAD, Arrhythmias), chronic pulmonary issues, previous or current neurovascular/neurological issues, previous strokes, diabetes mellitus, previous wound healing issues, previous wound or skin infections, PVD, clotting disorders, if the patient uses chronic steroids, if the patient takes or has immune compromising medications or diseases, or has previously or currently used tobacco products.     The patient verbalized that he/she does not have any additional clotting, bleeding, or blood disorders, other than what is list in her chart on today's review.     Then a brief history and physical exam were performed.    Review of Systems   Constitution: Negative. Negative for chills, fever and night sweats.   HENT: Negative for congestion and headaches.    Eyes: Negative for blurred vision, left vision loss and right vision loss.   Cardiovascular: Negative for chest pain and syncope.   Respiratory: Negative for cough and shortness of breath.    Endocrine: Negative for polydipsia, polyphagia and polyuria.   Hematologic/Lymphatic: Negative for bleeding problem. Does not bruise/bleed easily.   Skin: Negative for dry skin, itching and rash.   Musculoskeletal: Negative for  falls and muscle weakness.   Gastrointestinal: Negative for abdominal pain and bowel incontinence.   Genitourinary: Negative for bladder incontinence and nocturia.   Neurological: Negative for disturbances in coordination, loss of balance and seizures.   Psychiatric/Behavioral: Negative for depression. The patient does not have insomnia.    Allergic/Immunologic: Negative for hives and persistent infections.     PHYSICAL EXAM:  GEN: A&Ox3, WD WN NAD  HEENT: WNL  CHEST: CTAB, no W/R/R  HEART: RRR, no M/R/G  ABD: Soft, NT ND, BS x4 QUADS  MS; See Epic  NEURO: CN II-XII intact       The surgical consent was then reviewed with the patient, who agreed with all the contents of the consent form and it was signed. he was then given the surgery packet to bring with him to surgery center for the anesthesia portion of his perioperative paperwork (if needed)   For all physicians except for Dr. Snider, we will email and possibly fax the consent forms and booking sheets to [facility].    The patient was given the opportunity to ask questions about the surgical plan and consent form, and once no other questions were asked, I proceeded with the pre-op appointment.    PHYSICAL THERAPY:  He was also instructed regarding physical therapy and will begin on  POD1. He was given a copy of the original prescription to schedule. Another copy of this prescription was also faxed to [facility].    POST OP CARE:instructions were reviewed including care of the wound and dressing after surgery and when he can shower. Patient was told not sleep or lay on there surgical extremity following surgery as this could cause repair damage, tissue damage, or nerve injury.    An extensive amount of time was spent on discussion of the following information based on what type of surgery the patient was having. Patient expressed understanding of the material below:    Shoulder surgery or upper extremity surgery requiring post-op sling:  It was  explained to the patient that they should remove their arm from the sling approximately 6 times per day to do full elbow ROM (flexion and extension) and full supination and pronation of the elbow for approximately 5 minutes at a time to help prevent elbow stiffness, nerve pain or problems, or nerve injury. They were told to contact us if they begin having numbness and tingling of there surgical extremity that persists longer then 1 day without relief.     Extremity surgery requiring a splint:   It was explained to patient on how to properly elevated position there extremity to prevent pressure ulcers from occurring. I made sure that the patient understood that that surgical site may be numb following surgery and prevent them from feeling pressure pain that they would normal feel if a pressure injury was occurring. Pressure ulcers and there causes were discussed with the patient today.     Post-operative splint:  It was explain to the patient that they can contact us at anytime if they feel that there is a problem with their splint or under their splint that needs evaluation. If there is concern, questions, or discomfort with the splint then they can present to either our clinic or the [facility] ED for removal, evaluation, and replacement of the splint.    CRUTCHES OR WALKER: It was explained to the patient that if they are having a lower extremity surgery that they will require either a walker or crutches to ambulate safely with after surgery. It was explained that a cane or other assistive devices are not sufficient to safely ambulate with after surgery. I explained to the patient that I will place an order for them to receive either crutches or a walker after surgery to go home with. It was explained that if they have crutches or a walker at home already, that they are REQUIRED to bring them to the hospital on the day of surgery. It was explained that if they do not have them at the hospital on the day  of surgery that they WILL be provided a new pair or crutches or a walker to go home with to ensure ambulation will be safe if the patient needs to stop somewhere on the way home.      If the patient's mobility limitation cannot be sufficiently resolved by the use of crutches then it is necessary for the patient's functional mobility deficit to be sufficiently resolved with the use of a (Rolling Walker or Walker). Patient's mobility limitation significantly impairs their ability to participate in one of more activities of daily living. The use of a (Rolling Walker or Walker) will significantly improve the patient's ability to participate in MRADLS and the patient will use it on regular basis in the home.      PAIN MANAGEMENT: Jose Frost was also given a pain management regime, which includes the option of getting a TENS unit given to him by [facility] (if patient chooses) along with the education required for its use. He was also instructed regarding the Polar ice unit or gel ice packs (chosen by patient) that will be in place after surgery and his postoperative pain medications.     Patient understands that Polar Ice machine has to be bought today if they want it. It cannot be bought on day of surgery at surgery center.     PAIN MEDICATION:  Norco 10/325mg 1 po q 4-6 hours prn pain  Ultram 50 mg Take 1-2 p.o. q.6 hours p.r.n. breakthrough pain,   Phenergan 25 mg one p.o. q.6 hours p.r.n. nausea and vomiting.    DVT prophylaxis was discussed with the patient today including risk factors for developing DVTs and history of DVTs. The patient was asked if any specific recommendations were given from the doctor/s that did pre-operative surgical clearance. The patient was then given an education sheet about DVTs and PE with warning signs and symptoms of both and steps to take if they suspect either of these.    This along with the Modified Caprini risk assessment model for VTE in general surgical patients was  used to determine the patient's DVT risk.     From: Aristeo MK, Vitaly DA, Loyda SM, et al. Prevention of VTE in nonorthopedic surgical patients: antithrombotic therapy and prevention of thrombosis, 9th ed: American College of [employer]icians evidence-based clinical practical guidelines. Chest 2012; 141:e227S. Copyright © 2012. Reproduced with permission from the American College of Chest Physicians.    The below listed DVT prophylaxis regimen along with bilateral GIOVANNY compression stockings will be used post-op. Length of treatment has been determined to be 10-42 days post-op by the above noted Caprini assessment model.     The patient was instructed to buy and take:  Aspirin 81mg QD x 4 weeks for DVT prophylaxis starting on the morning after surgery.  Patient will also use bilateral TEDs on lower extremities, SCDs during surgery, and early ambulation post-op. If the patient was previously taking 81mg baby aspirin, they were told to not take it starting 5 days prior to surgery and to restart the 81mg aspirin after surgery.       Patient was also told to buy over the counter Prilosec medication if needed and take it once daily for GI protection as long as they are taking NSAIDs or Aspirin.   Patient denies history of seizures.        The patient was told that narcotic pain medications may make them drowsy and instructions were given to not sign legal documents, drive or operate heavy machinery, cars, or equipment while under the influence of narcotic medications. The patient was told and understands that narcotic pain medications should only be used as needed to control pain and that other options of pain control include TENs unit and ice packs/unit.     As there were no other questions to be asked, he was given my business card along with Ivelisse Cano MD business card if he has any questions or concerns prior to surgery or in the postop period.

## 2024-06-05 ENCOUNTER — TELEPHONE (OUTPATIENT)
Dept: SPORTS MEDICINE | Facility: CLINIC | Age: 39
End: 2024-06-05
Payer: COMMERCIAL

## 2024-06-06 ENCOUNTER — ANESTHESIA EVENT (OUTPATIENT)
Dept: SURGERY | Facility: HOSPITAL | Age: 39
End: 2024-06-06
Payer: COMMERCIAL

## 2024-06-06 ENCOUNTER — HOSPITAL ENCOUNTER (OUTPATIENT)
Facility: HOSPITAL | Age: 39
Discharge: HOME | End: 2024-06-06
Attending: ORTHOPAEDIC SURGERY | Admitting: ORTHOPAEDIC SURGERY
Payer: COMMERCIAL

## 2024-06-06 ENCOUNTER — ANESTHESIA (OUTPATIENT)
Dept: SURGERY | Facility: HOSPITAL | Age: 39
End: 2024-06-06
Payer: COMMERCIAL

## 2024-06-06 VITALS
OXYGEN SATURATION: 100 % | BODY MASS INDEX: 27.28 KG/M2 | HEART RATE: 43 BPM | TEMPERATURE: 98 F | DIASTOLIC BLOOD PRESSURE: 80 MMHG | HEIGHT: 68 IN | WEIGHT: 180 LBS | RESPIRATION RATE: 15 BRPM | SYSTOLIC BLOOD PRESSURE: 117 MMHG

## 2024-06-06 DIAGNOSIS — S83.232D COMPLEX TEAR OF MEDIAL MENISCUS OF LEFT KNEE AS CURRENT INJURY, SUBSEQUENT ENCOUNTER: ICD-10-CM

## 2024-06-06 PROBLEM — S83.232A COMPLEX TEAR OF MEDIAL MENISCUS OF LEFT KNEE AS CURRENT INJURY: Status: ACTIVE | Noted: 2024-06-06

## 2024-06-06 PROCEDURE — 25000003 PHARM REV CODE 250: Performed by: PHYSICIAN ASSISTANT

## 2024-06-06 PROCEDURE — 37000009 HC ANESTHESIA EA ADD 15 MINS: Performed by: ORTHOPAEDIC SURGERY

## 2024-06-06 PROCEDURE — 25000003 PHARM REV CODE 250: Performed by: ORTHOPAEDIC SURGERY

## 2024-06-06 PROCEDURE — 63600175 PHARM REV CODE 636 W HCPCS: Performed by: ANESTHESIOLOGY

## 2024-06-06 PROCEDURE — 63600175 PHARM REV CODE 636 W HCPCS: Performed by: NURSE ANESTHETIST, CERTIFIED REGISTERED

## 2024-06-06 PROCEDURE — D9220A PRA ANESTHESIA: Mod: CRNA,,, | Performed by: NURSE ANESTHETIST, CERTIFIED REGISTERED

## 2024-06-06 PROCEDURE — D9220A PRA ANESTHESIA: Mod: ANES,,, | Performed by: STUDENT IN AN ORGANIZED HEALTH CARE EDUCATION/TRAINING PROGRAM

## 2024-06-06 PROCEDURE — 25000003 PHARM REV CODE 250: Performed by: ANESTHESIOLOGY

## 2024-06-06 PROCEDURE — 36000710: Performed by: ORTHOPAEDIC SURGERY

## 2024-06-06 PROCEDURE — 27201423 OPTIME MED/SURG SUP & DEVICES STERILE SUPPLY: Performed by: ORTHOPAEDIC SURGERY

## 2024-06-06 PROCEDURE — 25000003 PHARM REV CODE 250: Performed by: NURSE ANESTHETIST, CERTIFIED REGISTERED

## 2024-06-06 PROCEDURE — 29876 ARTHRS KNEE SURG SYNVCT MAJ: CPT | Mod: 51,LT,, | Performed by: ORTHOPAEDIC SURGERY

## 2024-06-06 PROCEDURE — 94761 N-INVAS EAR/PLS OXIMETRY MLT: CPT

## 2024-06-06 PROCEDURE — 29881 ARTHRS KNE SRG MNISECTMY M/L: CPT | Mod: AS,LT,, | Performed by: PHYSICIAN ASSISTANT

## 2024-06-06 PROCEDURE — 63600175 PHARM REV CODE 636 W HCPCS: Performed by: ORTHOPAEDIC SURGERY

## 2024-06-06 PROCEDURE — 37000008 HC ANESTHESIA 1ST 15 MINUTES: Performed by: ORTHOPAEDIC SURGERY

## 2024-06-06 PROCEDURE — 36000711: Performed by: ORTHOPAEDIC SURGERY

## 2024-06-06 PROCEDURE — 99900035 HC TECH TIME PER 15 MIN (STAT)

## 2024-06-06 PROCEDURE — 71000015 HC POSTOP RECOV 1ST HR: Performed by: ORTHOPAEDIC SURGERY

## 2024-06-06 PROCEDURE — 29881 ARTHRS KNE SRG MNISECTMY M/L: CPT | Mod: LT,,, | Performed by: ORTHOPAEDIC SURGERY

## 2024-06-06 PROCEDURE — 71000033 HC RECOVERY, INTIAL HOUR: Performed by: ORTHOPAEDIC SURGERY

## 2024-06-06 RX ORDER — EPINEPHRINE 1 MG/ML
INJECTION, SOLUTION, CONCENTRATE INTRAVENOUS
Status: DISCONTINUED | OUTPATIENT
Start: 2024-06-06 | End: 2024-06-06 | Stop reason: HOSPADM

## 2024-06-06 RX ORDER — PROPOFOL 10 MG/ML
VIAL (ML) INTRAVENOUS
Status: DISCONTINUED | OUTPATIENT
Start: 2024-06-06 | End: 2024-06-06

## 2024-06-06 RX ORDER — MORPHINE SULFATE 2 MG/ML
2 INJECTION, SOLUTION INTRAMUSCULAR; INTRAVENOUS EVERY 10 MIN PRN
Status: DISCONTINUED | OUTPATIENT
Start: 2024-06-06 | End: 2024-06-06 | Stop reason: HOSPADM

## 2024-06-06 RX ORDER — MULTIVITAMIN
1 TABLET ORAL DAILY
COMMUNITY

## 2024-06-06 RX ORDER — SODIUM CHLORIDE 9 MG/ML
INJECTION, SOLUTION INTRAVENOUS CONTINUOUS
Status: DISCONTINUED | OUTPATIENT
Start: 2024-06-06 | End: 2024-06-06 | Stop reason: HOSPADM

## 2024-06-06 RX ORDER — FENTANYL CITRATE 50 UG/ML
25 INJECTION, SOLUTION INTRAMUSCULAR; INTRAVENOUS EVERY 5 MIN PRN
Status: DISCONTINUED | OUTPATIENT
Start: 2024-06-06 | End: 2024-06-06 | Stop reason: HOSPADM

## 2024-06-06 RX ORDER — DEXAMETHASONE SODIUM PHOSPHATE 4 MG/ML
INJECTION, SOLUTION INTRA-ARTICULAR; INTRALESIONAL; INTRAMUSCULAR; INTRAVENOUS; SOFT TISSUE
Status: DISCONTINUED | OUTPATIENT
Start: 2024-06-06 | End: 2024-06-06

## 2024-06-06 RX ORDER — KETAMINE HYDROCHLORIDE 100 MG/ML
INJECTION, SOLUTION INTRAMUSCULAR; INTRAVENOUS
Status: DISCONTINUED | OUTPATIENT
Start: 2024-06-06 | End: 2024-06-06 | Stop reason: HOSPADM

## 2024-06-06 RX ORDER — PROMETHAZINE HYDROCHLORIDE 25 MG/1
25 TABLET ORAL EVERY 6 HOURS PRN
Status: DISCONTINUED | OUTPATIENT
Start: 2024-06-06 | End: 2024-06-06 | Stop reason: HOSPADM

## 2024-06-06 RX ORDER — TRAMADOL HYDROCHLORIDE 50 MG/1
100 TABLET ORAL EVERY 6 HOURS PRN
Status: DISCONTINUED | OUTPATIENT
Start: 2024-06-06 | End: 2024-06-06 | Stop reason: HOSPADM

## 2024-06-06 RX ORDER — SODIUM CHLORIDE 0.9 % (FLUSH) 0.9 %
3 SYRINGE (ML) INJECTION
Status: DISCONTINUED | OUTPATIENT
Start: 2024-06-06 | End: 2024-06-06 | Stop reason: HOSPADM

## 2024-06-06 RX ORDER — FAMOTIDINE 10 MG/ML
INJECTION INTRAVENOUS
Status: DISCONTINUED | OUTPATIENT
Start: 2024-06-06 | End: 2024-06-06

## 2024-06-06 RX ORDER — FENTANYL CITRATE 50 UG/ML
INJECTION, SOLUTION INTRAMUSCULAR; INTRAVENOUS
Status: DISCONTINUED | OUTPATIENT
Start: 2024-06-06 | End: 2024-06-06

## 2024-06-06 RX ORDER — LIDOCAINE HYDROCHLORIDE 20 MG/ML
INJECTION INTRAVENOUS
Status: DISCONTINUED | OUTPATIENT
Start: 2024-06-06 | End: 2024-06-06

## 2024-06-06 RX ORDER — ONDANSETRON HYDROCHLORIDE 2 MG/ML
INJECTION, SOLUTION INTRAVENOUS
Status: DISCONTINUED | OUTPATIENT
Start: 2024-06-06 | End: 2024-06-06

## 2024-06-06 RX ORDER — MIDAZOLAM HYDROCHLORIDE 1 MG/ML
INJECTION, SOLUTION INTRAMUSCULAR; INTRAVENOUS
Status: DISCONTINUED | OUTPATIENT
Start: 2024-06-06 | End: 2024-06-06

## 2024-06-06 RX ORDER — OXYCODONE HYDROCHLORIDE 5 MG/1
5 TABLET ORAL
Status: DISCONTINUED | OUTPATIENT
Start: 2024-06-06 | End: 2024-06-06 | Stop reason: HOSPADM

## 2024-06-06 RX ORDER — CEFAZOLIN SODIUM 1 G/3ML
INJECTION, POWDER, FOR SOLUTION INTRAMUSCULAR; INTRAVENOUS
Status: DISCONTINUED | OUTPATIENT
Start: 2024-06-06 | End: 2024-06-06

## 2024-06-06 RX ORDER — OXYCODONE HYDROCHLORIDE 5 MG/1
10 TABLET ORAL EVERY 4 HOURS PRN
Status: DISCONTINUED | OUTPATIENT
Start: 2024-06-06 | End: 2024-06-06 | Stop reason: HOSPADM

## 2024-06-06 RX ORDER — METHOCARBAMOL 500 MG/1
1000 TABLET, FILM COATED ORAL ONCE
Status: COMPLETED | OUTPATIENT
Start: 2024-06-06 | End: 2024-06-06

## 2024-06-06 RX ORDER — ROPIVACAINE HYDROCHLORIDE 5 MG/ML
INJECTION, SOLUTION EPIDURAL; INFILTRATION; PERINEURAL
Status: DISCONTINUED | OUTPATIENT
Start: 2024-06-06 | End: 2024-06-06 | Stop reason: HOSPADM

## 2024-06-06 RX ORDER — ACETAMINOPHEN 500 MG
500 TABLET ORAL EVERY 6 HOURS PRN
COMMUNITY

## 2024-06-06 RX ORDER — HALOPERIDOL 5 MG/ML
0.5 INJECTION INTRAMUSCULAR EVERY 10 MIN PRN
Status: DISCONTINUED | OUTPATIENT
Start: 2024-06-06 | End: 2024-06-06 | Stop reason: HOSPADM

## 2024-06-06 RX ORDER — KETOROLAC TROMETHAMINE 30 MG/ML
INJECTION, SOLUTION INTRAMUSCULAR; INTRAVENOUS
Status: DISCONTINUED | OUTPATIENT
Start: 2024-06-06 | End: 2024-06-06 | Stop reason: HOSPADM

## 2024-06-06 RX ORDER — ONDANSETRON HYDROCHLORIDE 2 MG/ML
4 INJECTION, SOLUTION INTRAVENOUS EVERY 12 HOURS PRN
Status: DISCONTINUED | OUTPATIENT
Start: 2024-06-06 | End: 2024-06-06 | Stop reason: HOSPADM

## 2024-06-06 RX ORDER — KETAMINE HCL IN 0.9 % NACL 50 MG/5 ML
SYRINGE (ML) INTRAVENOUS
Status: DISCONTINUED | OUTPATIENT
Start: 2024-06-06 | End: 2024-06-06

## 2024-06-06 RX ADMIN — DEXAMETHASONE SODIUM PHOSPHATE 8 MG: 4 INJECTION, SOLUTION INTRAMUSCULAR; INTRAVENOUS at 01:06

## 2024-06-06 RX ADMIN — GLYCOPYRROLATE 0.2 MG: 0.2 INJECTION, SOLUTION INTRAMUSCULAR; INTRAVENOUS at 01:06

## 2024-06-06 RX ADMIN — ONDANSETRON 4 MG: 2 INJECTION INTRAMUSCULAR; INTRAVENOUS at 01:06

## 2024-06-06 RX ADMIN — FAMOTIDINE 20 MG: 10 INJECTION, SOLUTION INTRAVENOUS at 01:06

## 2024-06-06 RX ADMIN — MIDAZOLAM HYDROCHLORIDE 2 MG: 2 INJECTION, SOLUTION INTRAMUSCULAR; INTRAVENOUS at 01:06

## 2024-06-06 RX ADMIN — SODIUM CHLORIDE: 0.9 INJECTION, SOLUTION INTRAVENOUS at 01:06

## 2024-06-06 RX ADMIN — OXYCODONE 5 MG: 5 TABLET ORAL at 02:06

## 2024-06-06 RX ADMIN — FENTANYL CITRATE 100 MCG: 50 INJECTION, SOLUTION INTRAMUSCULAR; INTRAVENOUS at 01:06

## 2024-06-06 RX ADMIN — CEFAZOLIN 2 G: 330 INJECTION, POWDER, FOR SOLUTION INTRAMUSCULAR; INTRAVENOUS at 01:06

## 2024-06-06 RX ADMIN — LIDOCAINE HYDROCHLORIDE 100 MG: 20 INJECTION INTRAVENOUS at 01:06

## 2024-06-06 RX ADMIN — METHOCARBAMOL 1000 MG: 500 TABLET ORAL at 02:06

## 2024-06-06 RX ADMIN — Medication 30 MG: at 01:06

## 2024-06-06 RX ADMIN — SODIUM CHLORIDE: 0.9 INJECTION, SOLUTION INTRAVENOUS at 11:06

## 2024-06-06 RX ADMIN — PROPOFOL 200 MG: 10 INJECTION, EMULSION INTRAVENOUS at 01:06

## 2024-06-06 RX ADMIN — FENTANYL CITRATE 25 MCG: 50 INJECTION INTRAMUSCULAR; INTRAVENOUS at 02:06

## 2024-06-06 NOTE — ANESTHESIA PREPROCEDURE EVALUATION
"                                                                                                             2024  Jose Frost is a 39 y.o., male.    Pre-operative evaluation for Procedure(s) (LRB):  ARTHROSCOPY, KNEE, WITH MENISCECTOMY (Left)  CHONDROPLASTY, KNEE (Left)  EXCISION, PLICA, KNEE, ARTHROSCOPIC (Left)    Jose Frost is a 39 y.o. male with pmh of asthma presents for above surgery    Past Medical History:   Diagnosis Date    Mild intermittent asthma, uncomplicated        Review of patient's allergies indicates:  No Known Allergies    No current facility-administered medications on file prior to encounter.     Current Outpatient Medications on File Prior to Encounter   Medication Sig Dispense Refill    acetaminophen (TYLENOL) 500 MG tablet Take 500 mg by mouth every 6 (six) hours as needed for Pain.      multivitamin (THERAGRAN) per tablet Take 1 tablet by mouth once daily.         Past Surgical History:   Procedure Laterality Date    TONSILLECTOMY         CBC: No results for input(s): "WBC", "HGB", "HCT", "PLT" in the last 72 hours.    CMP: No results for input(s): "NA", "K", "CL", "CO2", "BUN", "CREATININE", "GLU", "MG", "PHOS", "CALCIUM", "ALBUMIN", "PROT", "ALKPHOS", "ALT", "AST", "BILITOT" in the last 72 hours.    COAGS  No results for input(s): "PT", "INR", "PROTIME", "APTT" in the last 72 hours.    BP Readings from Last 3 Encounters:   24 119/73   24 120/72   04/15/24 112/68       Diagnostic Studies:      EKD Echo:  No results found for this or any previous visit.      Pre-op Assessment    I have reviewed the Patient Summary Reports.     I have reviewed the Nursing Notes. I have reviewed the NPO Status.   I have reviewed the Medications.     Review of Systems  Anesthesia Hx:               Denies Personal Hx of Anesthesia complications.                    Pulmonary:    Asthma       Asthma:                   Physical Exam  General: Alert, Cooperative " and Oriented    Airway:  Mallampati: II   Mouth Opening: Normal  TM Distance: Normal  Neck ROM: Normal ROM        Anesthesia Plan  Type of Anesthesia, risks & benefits discussed:    Anesthesia Type: Gen ETT  Intra-op Monitoring Plan: Standard ASA Monitors  Post Op Pain Control Plan: multimodal analgesia  Induction:  IV  Airway Plan: Direct  Informed Consent: Informed consent signed with the Patient and all parties understand the risks and agree with anesthesia plan.  All questions answered.   ASA Score: 2  Day of Surgery Review of History & Physical: H&P Update referred to the surgeon/provider.    Ready For Surgery From Anesthesia Perspective.     .

## 2024-06-06 NOTE — PLAN OF CARE
Discharge instructions reviewed and pt verbalizes understanding. Pain control appropriate.  Dressing is clean, dry, and intact. VSS and afebrile. Crutches at bedside. Meds delivered to bedside. Pt ambulatory. AVS complete.

## 2024-06-06 NOTE — TRANSFER OF CARE
"Anesthesia Transfer of Care Note    Patient: Jose Frost    Procedure(s) Performed: Procedure(s) (LRB):  ARTHROSCOPY, KNEE, WITH MENISCECTOMY (Left)  CHONDROPLASTY, KNEE (Left)  SYNOVECTOMY, KNEE  LYSIS, ADHESIONS, KNEE, ARTHROSCOPIC  DEBRIDEMENT, KNEE    Patient location: PACU    Anesthesia Type: general    Transport from OR: Transported from OR on 6-10 L/min O2 by face mask with adequate spontaneous ventilation    Post pain: adequate analgesia    Post assessment: no apparent anesthetic complications and tolerated procedure well    Post vital signs: stable    Level of consciousness: awake, alert and oriented    Nausea/Vomiting: no nausea/vomiting    Complications: none    Transfer of care protocol was followed      Last vitals: Visit Vitals  /73 (BP Location: Left arm, Patient Position: Lying)   Pulse (!) 51   Temp 36.7 °C (98 °F) (Oral)   Resp 15   Ht 5' 8" (1.727 m)   Wt 81.6 kg (180 lb)   SpO2 100%   BMI 27.37 kg/m²     "

## 2024-06-06 NOTE — ANESTHESIA PROCEDURE NOTES
Intubation    Date/Time: 6/6/2024 1:14 PM    Performed by: Kody Rutledge CRNA  Authorized by: Santiago Rivas MD    Intubation:     Induction:  Intravenous    Intubated:  Postinduction    Mask Ventilation:  Easy mask    Attempts:  1    Attempted By:  CRNA    Laryngeal View Grade: Grade I - full view of cords      Difficult Airway Encountered?: No      Complications:  None    Airway Device:  Supraglottic airway/LMA    Airway Device Size:  4.0    Style/Cuff Inflation:  Cuffed    Secured at:  The lips    Placement Verified By:  Capnometry    Complicating Factors:  None    Findings Post-Intubation:  BS equal bilateral and atraumatic/condition of teeth unchanged

## 2024-06-06 NOTE — DISCHARGE SUMMARY
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    DISPOSITION: Home or Self Care    FINAL DIAGNOSIS:  left knee meniscus tear    FOLLOWUP: In clinic    DISCHARGE INSTRUCTIONS:    Discharge Procedure Orders   Diet general     Call MD for:  temperature >100.4     Call MD for:  persistent nausea and vomiting     Call MD for:  severe uncontrolled pain     Call MD for:  difficulty breathing, headache or visual disturbances     Call MD for:  redness, tenderness, or signs of infection (pain, swelling, redness, odor or green/yellow discharge around incision site)     Call MD for:  hives     Call MD for:  persistent dizziness or light-headedness     Ice to affected area   Order Comments: using barrier between ice and skin (specify duration&frequency)     No driving, operating heavy equipment or signing legal documents while taking pain medication     Remove dressing in 72 hours     Shower on day dressing removed (No bath)

## 2024-06-06 NOTE — OPERATIVE NOTE ADDENDUM
Certification of Assistant at Surgery       Surgery Date: 6/6/2024     Participating Surgeons:  Surgeons and Role:     * Ivelisse Cano MD - Primary    CHUY Macias PA-C - 1st Assistant     Procedures:  Procedure(s) (LRB):  ARTHROSCOPY, KNEE, WITH MENISCECTOMY (Left)  CHONDROPLASTY, KNEE (Left)  SYNOVECTOMY, KNEE  LYSIS, ADHESIONS, KNEE, ARTHROSCOPIC  DEBRIDEMENT, KNEE    Assistant Surgeon's Certification of Necessity:  I understand that section 1842 (b) (6) (d) of the Social Security Act generally prohibits Medicare Part B reasonable charge payment for the services of assistants at surgery in teaching hospitals when qualified residents are available to furnish such services. I certify that the services for which payment is claimed were medically necessary, and that no qualified resident was available to perform the services. I further understand that these services are subject to post-payment review by the Medicare carrier.         Alonso Macias PA-C    06/06/2024  2:22 PM

## 2024-06-06 NOTE — PROGRESS NOTES
Pre op complete. Waiting on anesthesia consent, site jadiel and update. Pt's wife at bedside; she has pt phone, clothes in locker. Pt resting comfortably with all questions addressed at this time and call light in reach. Pt will need crutches after surgery.

## 2024-06-07 ENCOUNTER — CLINICAL SUPPORT (OUTPATIENT)
Dept: REHABILITATION | Facility: HOSPITAL | Age: 39
End: 2024-06-07
Payer: COMMERCIAL

## 2024-06-07 DIAGNOSIS — R29.898 WEAKNESS OF LEFT LOWER EXTREMITY: ICD-10-CM

## 2024-06-07 DIAGNOSIS — R26.9 GAIT ABNORMALITY: ICD-10-CM

## 2024-06-07 DIAGNOSIS — S83.232D COMPLEX TEAR OF MEDIAL MENISCUS OF LEFT KNEE AS CURRENT INJURY, SUBSEQUENT ENCOUNTER: ICD-10-CM

## 2024-06-07 DIAGNOSIS — M25.662 DECREASED RANGE OF MOTION OF LEFT KNEE: Primary | ICD-10-CM

## 2024-06-07 PROCEDURE — 97161 PT EVAL LOW COMPLEX 20 MIN: CPT

## 2024-06-07 PROCEDURE — 97110 THERAPEUTIC EXERCISES: CPT

## 2024-06-10 PROBLEM — R29.898 WEAKNESS OF LEFT LOWER EXTREMITY: Status: ACTIVE | Noted: 2024-06-10

## 2024-06-10 PROBLEM — R26.9 GAIT ABNORMALITY: Status: ACTIVE | Noted: 2024-06-10

## 2024-06-10 PROBLEM — M25.662 DECREASED RANGE OF MOTION OF LEFT KNEE: Status: ACTIVE | Noted: 2024-06-10

## 2024-06-10 NOTE — OP NOTE
DATE OF PROCEDURE: 06/10/2024    SURGEON:  Ivelisse Cano M.D    ASSISTANT: CHUY Macias PA-C  The use of an assistant was medically necessary for positioning, skin retraction, and assistance with this procedure. The procedure could not be performed without the use of an assistant.   There was no qualified resident/fellow available for assistance with this procedure.    PREOPERATIVE DIAGNOSES:   left  1. knee medial meniscus tear   2. knee plica.   3. knee possible chondromalacia  4. knee synovitis  5. Knee adhesions    POSTOPERATIVE DIAGNOSES:   left  1. knee medial meniscus tear   2. knee plica.   3. knee chondromalacia  4. knee synovitis.   5. Knee adhesions    PROCEDURE PERFORMED:   left  1. knee arthroscopic medial meniscectomy   2. knee arthroscopic chondroplasty   3. knee arthroscopic partial synovectomy/debridement.   4. knee arthroscopic plica excision.   5. knee arthroscopic lysis of adhesions    ANESTHESIA: General with local 0.5% ropivicaine 30ml (5mg/ml), 60 mg ketamine, 60mg toradol (2ml toradol (30mg/ml))    BLOOD LOSS: Minimal.     DRAINS: None.     TOURNIQUET TIME: None.     COMPLICATIONS: None.     CONDITION ON TRANSFER: The patient was extubated and moved to the recovery room in stable condition, with compartments soft and capillary refill less than a second in all digits.     BRIEF INDICATION OF MEDICAL NECESSITY: The patient is a 39 y.o. year-old male who has history and physical examination findings consistent with the above. Nonoperative versus operative options were discussed. The risks and benefits were discussed with the patient. The patient acknowledged understanding and wished to proceed with operative intervention. Informed consent was obtained prior to the procedure. Details of the surgical procedure were explained, including incisions and probable rehabilitation course. The patient understands the likely length of convalescence after surgery; and we have explained the risks, benefits, and  alternatives of surgery. Reasonable expectations and potential complications were discussed and acknowledged, including but not limited to infection, bleeding, blood clots, (DVT and/or PE), nerve injury, retear, instability, continued pain and stiffness. It was also explained that there was a chance of failure which may require further management. The patient agreed and understood and wished to proceed.     EXAMINATION UNDER ANESTHESIA of the OPERATIVE left KNEE: ROM 0-145 degrees, negative Lachman, negative pivot shift, stable to varus-valgus stress testing, negative effusion.     EXAMINATION UNDER ANESTHESIA of the NON-OPERATED right KNEE: ROM 0-145 degrees, negative Lachman, negative pivot shift, stable to varus-valgus stress testing, negative effusion.     PROCEDURE IN DETAIL: The correct operative site was marked by the operative surgeon.  The patient was then taken to the operating room and placed supine on the operating room table. General anesthesia was administered by the anesthesia team. All pressure points were carefully padded and checked. Preoperative antibiotics were administered. A well-padded tourniquet was placed high on the operative thigh. Examination was begun with the above findings. The non-operative leg was then placed a well-padded well-leg aj, in a comfortable position. The operative leg was placed in an arthroscopic leg aj at the level of the tourniquet. The operative leg was prepped and draped in the usual sterile fashion. After prepping and draping, the appropriate landmarks were noted on the skin.  2cc skin and sub-cutaneous tissue was infilttrated with local anesthetic mixture superolaterally at needle insufflation site. The knee was insufflated supero-laterally with saline. 9cc skin wheal and sub-cutaneous tissue and fat pad was infilttrated with local anesthetic mixture at both portal sites; mid-lateral followed by infero-medial portals were created, and a systematic  examination of the joint revealed the following:    There was evidence of significant suprapatellar pouch adhesions and compartmentalization extending down to the medial and lateral gutters.  Using thermal device, these adhesions were taken down and lysis of adhesions was performed and synovectomy was performed as needed.  There was no evidence of any loose bodies in the medial or lateral gutters.       In the patellofemoral compartment, there was chondral damage to:  Patella 20 x 20 mm grade 2  Chondroplasty was performed using arthroscopic shaver.    In the medial compartment there was no evidence of chondral damage.   In the medial compartment there was no evidence of meniscal instability.   Posterior horn medial meniscus tear,  complex was debrided with arthroscopic shaver and biter.  25% was debrided over an area of 1.5 cm.  Root and hoop fibers remained intact.    Attention was then turned to the notch. The ACL and PCL were probed carefully and found to be stable.     There was a hypertrophic infrapatellar plica.  This was debrided using arthroscopic biter and shaver.    There was some scar about the ACL.  This was debrided in the standard fashion and lysis of adhesions was performed.    In the lateral compartment there was no evidence meniscal or chondral damage or meniscal instability.     Synovitis was debrided in the knee as needed to the areas of concern in medial and lateral compartments.      The knee and incisions were then copiously irrigated and fluid was extravasated using suction.  The arthroscopic portal incisions were closed using inverted 4-0 Monocryl suture.  5cc skin and sub-cutaneous tissue and around portals were infilttrated with local anesthetic mixture at both portal sites.  Steri-Strips were placed with Mastisol. Sterile TENS unit pads were placed which were medically necessary for pain relief. Wounds were dressed with Xeroform, 4x4s, and cast padding. GIOVANNY hose was placed on the  operative leg to match that of the GIOVANNY hose placed preoperatively on the non-operative leg. Iceman was secured.  The patient was extubated and moved to the recovery room in stable condition with compartments soft and capillary refill less than a second in all digits.     POSTOPERATIVE PLAN: We will be following the arthroscopic partial meniscectomy guidelines with emphasis on patellar mobility.  This was discussed this with the patient's family after surgery.

## 2024-06-10 NOTE — PLAN OF CARE
[facility]  Physical Therapy Initial Evaluation    Name: Jose Covington [facility]  Clinic Number: [id number]    Therapy Diagnosis:   Encounter Diagnoses   Name Primary?    Complex tear of medial meniscus of left knee as current injury, subsequent encounter     Decreased range of motion of left knee Yes    Weakness of left lower extremity     Gait abnormality      Physician: Alonso Macias III, *    Physician Orders: PT Eval and Treat  Medical Diagnosis from Referral: S83.232D (ICD-10-CM) - Complex tear of medial meniscus of left knee as current injury, subsequent encounter   Evaluation Date: 6/7/2024  Authorization Period Expiration: 06/03/2024  Plan of Care Expiration: 08/30/2024  Progress Note Due: 07/05/2024  Visit # / Visits authorized: 1/ 1   FOTO Follow Up: #1 given at [facility]  FOTO Follow UP:     Time In: 0200 pm  Time Out: 0300 pm  Total Appointment Time (timed & untimed codes): 60 minutes    DOS: 06/06/2024  S/p: left  1. knee arthroscopic medial meniscectomy   2. knee arthroscopic chondroplasty   3. knee arthroscopic partial synovectomy/debridement.   4. knee arthroscopic plica excision.   5. knee arthroscopic lysis of adhesions  Post-Op Precautions: s/p meniscectomy     Precautions: Standard and post-op    Subjective     Date of onset: 06/06/2024  History of current condition - Adria reports: in November of last year he began having progressive L knee pain. He noticed it initially when playing tennis 1-2 times per week. He began to become more limited by this pain when it began decreasing his ability to run. He has trained for multiple marathons and would like to do this again in the future, but regularly runs 3-6 miles a few times per week. He had increased pain with impact based activity, pivoting, and squatting. He underwent the above procedure 1 day ago and presents today in no distress and reports of no pain.      Imaging MRI: 1. Complex tear body segment/posterior horn medial  meniscus with mild extrusion, surrounding synovitis and reactive subchondral marrow edema.  2. Tricompartmental chondral loss as detailed above.  3. 0.4 x 0.5 cm low signal intensity body along the undersurface of the transverse ligament, likely osteocartilaginous in nature.       Prior Therapy: none  Exercise Routine/Sport Participation: tennis and long distance running multiple times per week. Regular marathon runner  Social History: lives with family  Occupation: [employer]   Prior Level of Function: increased pain with tennis cutting and running  Current Level of Function: ambulates with B axillary crutches 2/2 post-op precautions    Pain:  Current 1/10, worst 3/10, best 0/10   Location: left knee  Description: Aching, Dull, and Tight  Aggravating Factors: Standing and Bending  Easing Factors: pain medication, ice, and rest    Pts goals: to return to tennis and marathon training      Medical History:   Past Medical History:   Diagnosis Date    Mild intermittent asthma, uncomplicated        Surgical History:   Jose Frost  has a past surgical history that includes Tonsillectomy (1995); Knee arthroscopy w/ meniscectomy (Left, 6/6/2024); Chondroplasty of knee (Left, 6/6/2024); Synovectomy of knee (6/6/2024); lysis, adhesions, knee, arthroscopic (6/6/2024); and Knee debridement (6/6/2024).    Medications:   Jose Covington has a current medication list which includes the following prescription(s): acetaminophen, aspirin, hydrocodone-acetaminophen, multivitamin, promethazine, and tramadol.    Allergies:   Review of patient's allergies indicates:  No Known Allergies     Objective     Observation: bandaging in place under GIOVANNY hose, no visible distress, bleeding/drainage    Gait: step to gait pattern with B axillary crutches    Knee Active Range of Motion:   Right  Left    Flexion 130 deg 115 deg   Extension 10 deg hyper 5 deg hyper     Knee Passive Range of Motion:   Right  Left   "  Flexion 135 deg 125 deg   Extension 10 deg hyper 10 deg hyper       Ankle Active Range of Motion: WFL      Quad Set: good quality with superior patellar glide, active hyper extension achieved but not equal to contralateral side      Joint Mobility: unable to assess 2/2 bandaging       Palpation: unable to assess 2/2 bandaging       Sensation: unable to assess 2/2 bandaging       Pulses: intact dorsal pedal pulse      Edema:unable to assess 2/2 bandaging     Special Tests: Not performed today due to post-op status     Strength: Not performed today due to post-op status.        CMS Impairment/Limitation/Restriction for FOTO Knee Survey    Therapist reviewed FOTO scores for Jose Frost on 6/7/2024.   FOTO documents entered into [facility] - see Media section.    Limitation Score: 30%  Predicted Score: 12%  Category: Mobility       Treatment     Treatment Time In: 0235 pm  Treatment Time Out: 0300 pm  Total Treatment time separate from Evaluation: 25 minutes     Adria received the treatments listed below:      Therapeutic exercises to develop strength, endurance, ROM, and flexibility for 25 minutes including:  Quad set 20 x 5"  Heel prop x 5 min   Self patellar mobs x 2 min   Heel slides x 4 min   Ankle pumps 20 x 5 "   Crutch adjustment x training x 5 min         Home Exercises and Patient Education Provided     Education provided:   -  Pt was educated and demonstrated good understanding of post-op precautions, timeframe for recovery, prognosis, objective findings and progress/goals, Tx rationale/options, HEP review/discussion on modification and or progression/regression, expectations for rehab, normal and expected soreness, activity modification/avoidance/pacing, use of ICE/elevation of extremity, activity pacing, anatomy/physiology of pathology, benefits of exercise and physical therapy, use/fit of sling, wound care, s/s infection and DVT.       - Prognosis, activity modification, goals for therapy, role of " therapy for care, exercises/HEP    Written Home Exercises Provided: yes.  Exercises were reviewed and Adria was able to demonstrate them prior to the end of the session.   Pt received a written copy of exercises to perform at home. Adria demonstrated good  understanding of the education provided.     See EMR under patient instructions for exercises given.     Assessment     Jose Covington is a 39 y.o. male referred to outpatient Physical Therapy 1 day s/p left medial meniscectomy. He presents with limitations in gait, balance, motor control, joint mobility, range of motion, and strength. These deficits are consistent with the stage post-operatively. Pt will benefit from skilled outpatient Physical Therapy to address the deficits stated above and in the chart below, provide pt/family education, and to maximize pt's level of independence. Pt prognosis is Good.     Plan of care discussed with patient: Yes  Pt's spiritual, cultural and educational needs considered and patient is agreeable to the plan of care and goals as stated below:       Anticipated Barriers for therapy: none    Medical Necessity is demonstrated by the following  History  Co-morbidities and personal factors that may impact the plan of care [x] LOW: no personal factors / co-morbidities  [] MODERATE: 1-2 personal factors / co-morbidities  [] HIGH: 3+ personal factors / co-morbidities    Moderate / High Support Documentation:   Co-morbidities affecting plan of care: none    Personal Factors:   no deficits     Examination  Body Structures and Functions, activity limitations and participation restrictions that may impact the plan of care [] LOW: addressing 1-2 elements  [] MODERATE: 3+ elements  [x] HIGH: 4+ elements (please support below)    Moderate / High Support Documentation: gait, range of motion, balance, motor control, joint mobility, strength     Clinical Presentation [x] LOW: stable  [] MODERATE: Evolving  [] HIGH: Unstable     Decision Making/  Complexity Score: low       Goals:  Short Term Goals:2- 4 weeks   Pt independent in initial hep (Progressing, not met)  Pain 0-2/10 at worst (Progressing, not met)  Full active hyper extension, flexion 120 degrees or better (Progressing, not met)  Amb without deviations in community (Progressing, not met)  Pt reports 25% improvement in function or better (Progressing, not met)     Mid Term Goals: 6-8 weeks  Pt will report 0/10 pain to improve independence in ADLs (Progressing, not met)  Patient will demonstrate and maintain full, symmetric knee ROM to facilitate gait and squats. (Progressing, not met)  Anterior Y balance less than 4 cm difference to demonstrate satisfactory dynamic postural control to begin straight line jogging (Progressing, not met)  Eight inch Forward step down test pass with no deviations to demonstrate good eccentric quad control to progress to straight line jogging (Progressing, not met)  Isometric quad strength at 60 degree 90% or better LSI to demonstrate appropriate strength for straight link jogging   (Progressing, not met)  Pt reports 65% subjective improvement in function or better (Progressing, not met)     Long Term Goals: 10-12 weeks   Pt independent in d/c hep for maintenance of strength following cessation of formal therapy (Progressing, not met)  Isokinetic quad/hamstring strength 90% or better on all test to demonstrate appropriate limb symmetry for return to high level activity (Progressing, not met)  90% LSI on hop test to demonstrate appropriate limb control and power for return to high level activity (Progressing, not met)  Pt completes return to jogging program to demonstrate tolerance to repetitive joint loading for return to recreational exercise  (Progressing, not met)  Pt completes cutting, CoD program to demonstrate safety in all planes for return to recreational exercise (Progressing, not met)  Pt reports 90% or better subjective improvement in function (Progressing,  not met)    Plan   Plan of care Certification: 6/7/2024 to 08/30/2024.    Outpatient Physical Therapy 2 times weekly for 12 weeks to include the following interventions: Electrical Stimulation NMES, Gait Training, Manual Therapy, Moist Heat/ Ice, Neuromuscular Re-ed, Patient Education, Therapeutic Activities, and Therapeutic Exercise.     Olivia Mari, PT , DPT

## 2024-06-10 NOTE — OP NOTE
DATE OF PROCEDURE: 06/06/2024    SURGEON:  Ivelisse Cano M.D    ASSISTANT: CHUY Macias PA-C  The use of an assistant was medically necessary for positioning, skin retraction, and assistance with this procedure. The procedure could not be performed without the use of an assistant.   There was no qualified resident/fellow available for assistance with this procedure.    PREOPERATIVE DIAGNOSES:   left  1. knee medial meniscus tear   2. knee plica.   3. knee possible chondromalacia  4. knee synovitis  5. Knee adhesions    POSTOPERATIVE DIAGNOSES:   left  1. knee medial meniscus tear   2. knee plica.   3. knee chondromalacia  4. knee synovitis.   5. Knee adhesions    PROCEDURE PERFORMED:   left  1. knee arthroscopic medial meniscectomy   2. knee arthroscopic chondroplasty   3. knee arthroscopic partial synovectomy/debridement.   4. knee arthroscopic plica excision.   5. knee arthroscopic lysis of adhesions    ANESTHESIA: General with local 0.5% ropivicaine 30ml (5mg/ml), 60 mg ketamine, 60mg toradol (2ml toradol (30mg/ml))    BLOOD LOSS: Minimal.     DRAINS: None.     TOURNIQUET TIME: None.     COMPLICATIONS: None.     CONDITION ON TRANSFER: The patient was extubated and moved to the recovery room in stable condition, with compartments soft and capillary refill less than a second in all digits.     BRIEF INDICATION OF MEDICAL NECESSITY: The patient is a 39 y.o. year-old male who has history and physical examination findings consistent with the above. Nonoperative versus operative options were discussed. The risks and benefits were discussed with the patient. The patient acknowledged understanding and wished to proceed with operative intervention. Informed consent was obtained prior to the procedure. Details of the surgical procedure were explained, including incisions and probable rehabilitation course. The patient understands the likely length of convalescence after surgery; and we have explained the risks, benefits, and  alternatives of surgery. Reasonable expectations and potential complications were discussed and acknowledged, including but not limited to infection, bleeding, blood clots, (DVT and/or PE), nerve injury, retear, instability, continued pain and stiffness. It was also explained that there was a chance of failure which may require further management. The patient agreed and understood and wished to proceed.     EXAMINATION UNDER ANESTHESIA of the OPERATIVE left KNEE: ROM 0-145 degrees, negative Lachman, negative pivot shift, stable to varus-valgus stress testing, negative effusion.     EXAMINATION UNDER ANESTHESIA of the NON-OPERATED right KNEE: ROM 0-145 degrees, negative Lachman, negative pivot shift, stable to varus-valgus stress testing, negative effusion.     PROCEDURE IN DETAIL: The correct operative site was marked by the operative surgeon.  The patient was then taken to the operating room and placed supine on the operating room table. General anesthesia was administered by the anesthesia team. All pressure points were carefully padded and checked. Preoperative antibiotics were administered. A well-padded tourniquet was placed high on the operative thigh. Examination was begun with the above findings. The non-operative leg was then placed a well-padded well-leg aj, in a comfortable position. The operative leg was placed in an arthroscopic leg aj at the level of the tourniquet. The operative leg was prepped and draped in the usual sterile fashion. After prepping and draping, the appropriate landmarks were noted on the skin.  2cc skin and sub-cutaneous tissue was infilttrated with local anesthetic mixture superolaterally at needle insufflation site. The knee was insufflated supero-laterally with saline. 9cc skin wheal and sub-cutaneous tissue and fat pad was infilttrated with local anesthetic mixture at both portal sites; mid-lateral followed by infero-medial portals were created, and a systematic  examination of the joint revealed the following:    There was evidence of significant suprapatellar pouch adhesions and compartmentalization extending down to the medial and lateral gutters.  Using thermal device, these adhesions were taken down and lysis of adhesions was performed and synovectomy was performed as needed.  There was no evidence of any loose bodies in the medial or lateral gutters.       In the patellofemoral compartment, there was chondral damage to:  Patella 20 x 20 mm grade 2  Chondroplasty was performed using arthroscopic shaver.    In the medial compartment there was no evidence of chondral damage.   In the medial compartment there was no evidence of meniscal instability.   Posterior horn medial meniscus tear,  complex was debrided with arthroscopic shaver and biter.  25% was debrided over an area of 1.5 cm.  Root and hoop fibers remained intact.    Attention was then turned to the notch. The ACL and PCL were probed carefully and found to be stable.     There was a hypertrophic infrapatellar plica.  This was debrided using arthroscopic biter and shaver.    There was some scar about the ACL.  This was debrided in the standard fashion and lysis of adhesions was performed.    In the lateral compartment there was no evidence meniscal or chondral damage or meniscal instability.     Synovitis was debrided in the knee as needed to the areas of concern in medial and lateral compartments.      The knee and incisions were then copiously irrigated and fluid was extravasated using suction.  The arthroscopic portal incisions were closed using inverted 4-0 Monocryl suture.  5cc skin and sub-cutaneous tissue and around portals were infilttrated with local anesthetic mixture at both portal sites.  Steri-Strips were placed with Mastisol. Sterile TENS unit pads were placed which were medically necessary for pain relief. Wounds were dressed with Xeroform, 4x4s, and cast padding. GIOVANNY hose was placed on the  operative leg to match that of the GIOVANNY hose placed preoperatively on the non-operative leg. Iceman was secured.  The patient was extubated and moved to the recovery room in stable condition with compartments soft and capillary refill less than a second in all digits.     POSTOPERATIVE PLAN: We will be following the arthroscopic partial meniscectomy guidelines with emphasis on patellar mobility.  This was discussed this with the patient's family after surgery.

## 2024-06-11 ENCOUNTER — CLINICAL SUPPORT (OUTPATIENT)
Dept: REHABILITATION | Facility: HOSPITAL | Age: 39
End: 2024-06-11
Payer: COMMERCIAL

## 2024-06-11 DIAGNOSIS — M25.662 DECREASED RANGE OF MOTION OF LEFT KNEE: Primary | ICD-10-CM

## 2024-06-11 DIAGNOSIS — R26.9 GAIT ABNORMALITY: ICD-10-CM

## 2024-06-11 DIAGNOSIS — R29.898 WEAKNESS OF LEFT LOWER EXTREMITY: ICD-10-CM

## 2024-06-11 PROCEDURE — 97140 MANUAL THERAPY 1/> REGIONS: CPT

## 2024-06-11 PROCEDURE — 97110 THERAPEUTIC EXERCISES: CPT

## 2024-06-11 PROCEDURE — 97112 NEUROMUSCULAR REEDUCATION: CPT

## 2024-06-11 NOTE — ANESTHESIA POSTPROCEDURE EVALUATION
Anesthesia Post Evaluation    Patient: Jose Frost    Procedure(s) Performed: Procedure(s) (LRB):  ARTHROSCOPY, KNEE, WITH MENISCECTOMY (Left)  CHONDROPLASTY, KNEE (Left)  SYNOVECTOMY, KNEE  LYSIS, ADHESIONS, KNEE, ARTHROSCOPIC  DEBRIDEMENT, KNEE    Final Anesthesia Type: general      Patient location during evaluation: PACU  Patient participation: Yes- Able to Participate  Level of consciousness: awake and alert  Post-procedure vital signs: reviewed and stable  Pain management: adequate  Airway patency: patent    PONV status at discharge: No PONV  Anesthetic complications: no      Cardiovascular status: hemodynamically stable  Hydration status: euvolemic  Follow-up not needed.              Vitals Value Taken Time   /80 06/06/24 1502   Temp 36.7 °C (98.1 °F) 06/06/24 1500   Pulse 53 06/06/24 1519   Resp 18 06/06/24 1519   SpO2 100 % 06/06/24 1519   Vitals shown include unfiled device data.      Event Time   Out of Recovery 14:50:00         Pain/Padilla Score: No data recorded

## 2024-06-11 NOTE — PROGRESS NOTES
[facility]   Physical Therapy Treatment Note     Name: Jose Covington [facility]  Clinic Number: [id number]    Therapy Diagnosis:   Encounter Diagnoses   Name Primary?    Decreased range of motion of left knee Yes    Weakness of left lower extremity     Gait abnormality      Physician: Alonso Macias III, *    Visit Date: 6/11/2024  Physician Orders: PT Eval and Treat  Medical Diagnosis from Referral: S83.232D (ICD-10-CM) - Complex tear of medial meniscus of left knee as current injury, subsequent encounter   Evaluation Date: 6/7/2024  Authorization Period Expiration: 06/03/2024  Plan of Care Expiration: 08/30/2024  Progress Note Due: 07/05/2024  Visit # / Visits authorized: 1/ 20 (+eval)  FOTO Follow Up: #1 given at eval  FOTO Follow UP:      Time In: 0200 pm  Time Out: 0300 pm  Total Billable Time: 60 minutes     DOS: 06/06/2024  S/p: left  1. knee arthroscopic medial meniscectomy   2. knee arthroscopic chondroplasty   3. knee arthroscopic partial synovectomy/debridement.   4. knee arthroscopic plica excision.   5. knee arthroscopic lysis of adhesions  Post-Op Precautions: s/p meniscectomy      Precautions: Standard and post-op    SUBJECTIVE     Pt reports: he can tell his quad fatigues quickly.  He was compliant with home exercise program.  Response to previous treatment: improved knee mobility  Functional change: improved gait with crutches    Pain: 2/10  Location: left knee      OBJECTIVE     Objective Measures updated at progress report unless specified.     Left knee PROM: 10-0-123    Treatment       Adria received the treatments listed below:      THERAPEUTIC EXERCISES to develop strength, endurance, ROM, flexibility, posture, and core stabilization for 14 minutes including:  Heel slide; 3 min  Upright Bike; Lv 4.0 for 10 min for CV endurance and tissue extensiblity    MANUAL THERAPY TECHNIQUES including Joint mobilizations and Soft tissue Mobilization were applied to L knee for 15  "minutes.  Fat pad mob at 0 and 20 deg flex  Superior patellar glide Gr 2-3    NEUROMUSCULAR RE-EDUCATION ACTIVITIES to improve Balance, Coordination, Kinesthetic, Sense, Proprioception, and Posture for 31 minutes.  The following were included:   Quad set 20 x 10"  SLR 3 x 10  TKE GTB: 2 x 10 x 5"    THERAPEUTIC ACTIVITIES to improve dynamic and functional performance for 00 minutes including.    GAIT TRAINING to improve functional mobility and safety for 00 minutes.           Patient Education and Home Exercises     Home Exercises Provided and Patient Education Provided     Education provided:   - continue HEP, add SLR    Written Home Exercises Provided: Patient instructed to cont prior HEP. Exercises were reviewed and Adria was able to demonstrate them prior to the end of the session.  Adria demonstrated good  understanding of the education provided. See EMR under Patient Instructions for exercises provided during therapy sessions    ASSESSMENT     Adria is progressing well with quad activation with full active hyperextension today. He demonstrated good SLR without lag for 10 repetitions, but began to fatigue with subsequent sets. Will continue to progress quad control and mobility as able.     Adria Is progressing well towards his goals.   Pt prognosis is Good.     Pt will continue to benefit from skilled outpatient physical therapy to address the deficits listed in the problem list box on initial evaluation, provide pt/family education and to maximize pt's level of independence in the home and community environment.     Pt's spiritual, cultural and educational needs considered and pt agreeable to plan of care and goals.     Anticipated barriers to physical therapy: none    Goals:   Short Term Goals:2- 4 weeks   Pt independent in initial hep (Progressing, not met)  Pain 0-2/10 at worst (Progressing, not met)  Full active hyper extension, flexion 120 degrees or better (Progressing, not met)  Amb without deviations " in community (Progressing, not met)  Pt reports 25% improvement in function or better (Progressing, not met)     Mid Term Goals: 6-8 weeks  Pt will report 0/10 pain to improve independence in ADLs (Progressing, not met)  Patient will demonstrate and maintain full, symmetric knee ROM to facilitate gait and squats. (Progressing, not met)  Anterior Y balance less than 4 cm difference to demonstrate satisfactory dynamic postural control to begin straight line jogging (Progressing, not met)  Eight inch Forward step down test pass with no deviations to demonstrate good eccentric quad control to progress to straight line jogging (Progressing, not met)  Isometric quad strength at 60 degree 90% or better LSI to demonstrate appropriate strength for straight link jogging   (Progressing, not met)  Pt reports 65% subjective improvement in function or better (Progressing, not met)     Long Term Goals: 10-12 weeks   Pt independent in d/c hep for maintenance of strength following cessation of formal therapy (Progressing, not met)  Isokinetic quad/hamstring strength 90% or better on all test to demonstrate appropriate limb symmetry for return to high level activity (Progressing, not met)  90% LSI on hop test to demonstrate appropriate limb control and power for return to high level activity (Progressing, not met)  Pt completes return to jogging program to demonstrate tolerance to repetitive joint loading for return to recreational exercise  (Progressing, not met)  Pt completes cutting, CoD program to demonstrate safety in all planes for return to recreational exercise (Progressing, not met)  Pt reports 90% or better subjective improvement in function (Progressing, not met)    PLAN     Progress quad control as able     Olivia Mari, PT, DPT

## 2024-06-13 ENCOUNTER — CLINICAL SUPPORT (OUTPATIENT)
Dept: REHABILITATION | Facility: HOSPITAL | Age: 39
End: 2024-06-13
Payer: COMMERCIAL

## 2024-06-13 DIAGNOSIS — R29.898 WEAKNESS OF LEFT LOWER EXTREMITY: ICD-10-CM

## 2024-06-13 DIAGNOSIS — R26.9 GAIT ABNORMALITY: ICD-10-CM

## 2024-06-13 DIAGNOSIS — M25.662 DECREASED RANGE OF MOTION OF LEFT KNEE: Primary | ICD-10-CM

## 2024-06-13 PROCEDURE — 97112 NEUROMUSCULAR REEDUCATION: CPT

## 2024-06-13 PROCEDURE — 97110 THERAPEUTIC EXERCISES: CPT

## 2024-06-13 NOTE — PROGRESS NOTES
"[facility]   Physical Therapy Treatment Note     [facility]  Cl[facility] Number: [id number]    Therapy Diagnosis:   Encounter Diagnoses   Name Primary?    Decreased range of motion of left knee Yes    Weakness of left lower extremity     Gait abnormality      Physician: Alonso Macias III, *    Visit Date: 6/13/2024  Physician Orders: PT Eval and Treat  Medical Diagnosis from Referral: S83.232D (ICD-10-CM) - Complex tear of medial meniscus of left knee as current injury, subsequent encounter   Evaluation Date: 6/7/2024  Authorization Period Expiration: 06/03/2024  Plan of Care Expiration: 08/30/2024  Progress Note Due: 07/05/2024  Visit # / Visits authorized: 2/ 20 (+eval)  FOTO Follow Up: #1 given at [facility]  FOTO Follow UP:      Time In: 0510 pm  Time Out: 0600 pm  Total Billable Time: 50 minutes     DOS: 06/06/2024  S/p: left  1. knee arthroscopic medial meniscectomy   2. knee arthroscopic chondroplasty   3. knee arthroscopic partial synovectomy/debridement.   4. knee arthroscopic plica excision.   5. knee arthroscopic lysis of adhesions  Post-Op Precautions: s/p meniscectomy      Precautions: Standard and post-op    SUBJECTIVE     Pt reports: he feels good but his knee is a little sore  He was compliant with home exercise program.  Response to previous treatment: improved knee mobility  Functional change: improved gait with crutches    Pain: 2/10  Location: left knee      OBJECTIVE     Objective Measures updated at progress report unless specified.     Left knee PROM: 10-0-123    Treatment       Adria received the treatments listed below:      THERAPEUTIC EXERCISES to develop strength, endurance, ROM, flexibility, posture, and core stabilization for 20 minutes including:  Heel slide; 3 min  Upright Bike; Lv 4.0 for 10 min for CV endurance and tissue extensiblity  DL calf raises; 3 x 10 x 5"   SL hip abd + ext; 3 x 10    MANUAL THERAPY TECHNIQUES including Joint " "mobilizations and Soft tissue Mobilization were applied to L knee for 05 minutes.  Fat pad mob at 0 and 20 deg flex  Superior patellar glide Gr 2-3    NEUROMUSCULAR RE-EDUCATION ACTIVITIES to improve Balance, Coordination, Kinesthetic, Sense, Proprioception, and Posture for 25 minutes.  The following were included:   Quad set w/ AAROM strap; 10 x 10"   Quad set w/ towel roll; 10 x 5"   Quad set 20 x 15"  SLR 3 x 10  TKE GTB: 2 x 10 x 5"- NP    THERAPEUTIC ACTIVITIES to improve dynamic and functional performance for 00 minutes including.    GAIT TRAINING to improve functional mobility and safety for 00 minutes.           Patient Education and Home Exercises     Home Exercises Provided and Patient Education Provided     Education provided:   - continue HEP, add SLR    Written Home Exercises Provided: Patient instructed to cont prior HEP. Exercises were reviewed and Adria was able to demonstrate them prior to the end of the session.  Adria demonstrated good  understanding of the education provided. See EMR under Patient Instructions for exercises provided during therapy sessions    ASSESSMENT     Adria is progressing well with quad weakness and mild anterior knee swelling as anticipated. He was appropriately challenged by lateral hip strengthening and will benefit continued progression of this in addition to quad strengthening.   Adria Is progressing well towards his goals.   Pt prognosis is Good.     Pt will continue to benefit from skilled outpatient physical therapy to address the deficits listed in the problem list box on initial evaluation, provide pt/family education and to maximize pt's level of independence in the home and community environment.     Pt's spiritual, cultural and educational needs considered and pt agreeable to plan of care and goals.     Anticipated barriers to physical therapy: none    Goals:   Short Term Goals:2- 4 weeks   Pt independent in initial hep (Progressing, not met)  Pain 0-2/10 at " worst (Progressing, not met)  Full active hyper extension, flexion 120 degrees or better (Progressing, not met)  Amb without deviations in community (Progressing, not met)  Pt reports 25% improvement in function or better (Progressing, not met)     Mid Term Goals: 6-8 weeks  Pt will report 0/10 pain to improve independence in ADLs (Progressing, not met)  Patient will demonstrate and maintain full, symmetric knee ROM to facilitate gait and squats. (Progressing, not met)  Anterior Y balance less than 4 cm difference to demonstrate satisfactory dynamic postural control to begin straight line jogging (Progressing, not met)  Eight inch Forward step down test pass with no deviations to demonstrate good eccentric quad control to progress to straight line jogging (Progressing, not met)  Isometric quad strength at 60 degree 90% or better LSI to demonstrate appropriate strength for straight link jogging   (Progressing, not met)  Pt reports 65% subjective improvement in function or better (Progressing, not met)     Long Term Goals: 10-12 weeks   Pt independent in d/c hep for maintenance of strength following cessation of formal therapy (Progressing, not met)  Isokinetic quad/hamstring strength 90% or better on all test to demonstrate appropriate limb symmetry for return to high level activity (Progressing, not met)  90% LSI on hop test to demonstrate appropriate limb control and power for return to high level activity (Progressing, not met)  Pt completes return to jogging program to demonstrate tolerance to repetitive joint loading for return to recreational exercise  (Progressing, not met)  Pt completes cutting, CoD program to demonstrate safety in all planes for return to recreational exercise (Progressing, not met)  Pt reports 90% or better subjective improvement in function (Progressing, not met)    PLAN     Progress quad control as able     Olivia Mari, PT, DPT

## 2024-06-17 ENCOUNTER — CLINICAL SUPPORT (OUTPATIENT)
Dept: REHABILITATION | Facility: HOSPITAL | Age: 39
End: 2024-06-17
Payer: COMMERCIAL

## 2024-06-17 DIAGNOSIS — R29.898 WEAKNESS OF LEFT LOWER EXTREMITY: ICD-10-CM

## 2024-06-17 DIAGNOSIS — M25.662 DECREASED RANGE OF MOTION OF LEFT KNEE: Primary | ICD-10-CM

## 2024-06-17 DIAGNOSIS — R26.9 GAIT ABNORMALITY: ICD-10-CM

## 2024-06-17 PROCEDURE — 97112 NEUROMUSCULAR REEDUCATION: CPT

## 2024-06-17 PROCEDURE — 97110 THERAPEUTIC EXERCISES: CPT

## 2024-06-17 NOTE — PROGRESS NOTES
"[facility]   Physical Therapy Treatment Note     [facility]  Clinic Number: [id number]    Therapy Diagnosis:   Encounter Diagnoses   Name Primary?    Decreased range of motion of left knee Yes    Weakness of left lower extremity     Gait abnormality      Physician: Alonso Macias III, *    Visit Date: 6/17/2024  Physician Orders: PT Eval and Treat  Medical Diagnosis from Referral: S83.232D (ICD-10-CM) - Complex tear of medial meniscus of left knee as current injury, subsequent encounter   Evaluation Date: 6/7/2024  Authorization Period Expiration: 06/03/2024  Plan of Care Expiration: 08/30/2024  Progress Note Due: 07/05/2024  Visit # / Visits authorized: 3/ 20 (+eval)  FOTO Follow Up: #1 given at [facility]  FOTO Follow UP:      Time In: 0530 pm  Time Out: 0630 pm  Total Billable Time: 60 minutes     DOS: 06/06/2024  S/p: left  1. knee arthroscopic medial meniscectomy   2. knee arthroscopic chondroplasty   3. knee arthroscopic partial synovectomy/debridement.   4. knee arthroscopic plica excision.   5. knee arthroscopic lysis of adhesions  Post-Op Precautions: s/p meniscectomy      Precautions: Standard and post-op    SUBJECTIVE     Pt reports: he is feeling better  He was compliant with home exercise program.  Response to previous treatment: improved knee mobility  Functional change: improved gait with crutches    Pain: 2/10  Location: left knee      OBJECTIVE     Objective Measures updated at progress report unless specified.     Left knee PROM: 10-0-125    1 week and 4 days as of 6/17    Treatment       Adria received the treatments listed below:      THERAPEUTIC EXERCISES to develop strength, endurance, ROM, flexibility, posture, and core stabilization for 30 minutes including:  Heel slide; 3 min  Upright Bike; Lv 4.0 for 10 min for CV endurance and tissue extensiblity  DL calf raises; 3 x 10 x 5" - NP  SL hip abd + ext; 3 x 10  DL bridges; GTB; 3 x 10 x 3"   Shuttle DL " "Leg Press; 50#; 3 x 15     MANUAL THERAPY TECHNIQUES including Joint mobilizations and Soft tissue Mobilization were applied to L knee for 05 minutes.  Fat pad mob at 0 and 20 deg flex  Superior patellar glide Gr 2-3    NEUROMUSCULAR RE-EDUCATION ACTIVITIES to improve Balance, Coordination, Kinesthetic, Sense, Proprioception, and Posture for 25 minutes.  The following were included:   Quad set w/ AAROM strap; 10 x 10"   Quad set 20 x 5"  SLR 2 x 15    THERAPEUTIC ACTIVITIES to improve dynamic and functional performance for 00 minutes including.    GAIT TRAINING to improve functional mobility and safety for 00 minutes.           Patient Education and Home Exercises     Home Exercises Provided and Patient Education Provided     Education provided:   - continue HEP, add SLR    Written Home Exercises Provided: Patient instructed to cont prior HEP. Exercises were reviewed and Adria was able to demonstrate them prior to the end of the session.  Adria demonstrated good  understanding of the education provided. See EMR under Patient Instructions for exercises provided during therapy sessions    ASSESSMENT     Adria requires strap assist for full quad activation in quad set but is improving. He is improving in quad endurance in SLR and is appropriate to transition to single crutch. Will continue to progress as able.     Adria Is progressing well towards his goals.   Pt prognosis is Good.     Pt will continue to benefit from skilled outpatient physical therapy to address the deficits listed in the problem list box on initial evaluation, provide pt/family education and to maximize pt's level of independence in the home and community environment.     Pt's spiritual, cultural and educational needs considered and pt agreeable to plan of care and goals.     Anticipated barriers to physical therapy: none    Goals:   Short Term Goals:2- 4 weeks   Pt independent in initial hep (Progressing, not met)  Pain 0-2/10 at worst " (Progressing, not met)  Full active hyper extension, flexion 120 degrees or better (Progressing, not met)  Amb without deviations in community (Progressing, not met)  Pt reports 25% improvement in function or better (Progressing, not met)     Mid Term Goals: 6-8 weeks  Pt will report 0/10 pain to improve independence in ADLs (Progressing, not met)  Patient will demonstrate and maintain full, symmetric knee ROM to facilitate gait and squats. (Progressing, not met)  Anterior Y balance less than 4 cm difference to demonstrate satisfactory dynamic postural control to begin straight line jogging (Progressing, not met)  Eight inch Forward step down test pass with no deviations to demonstrate good eccentric quad control to progress to straight line jogging (Progressing, not met)  Isometric quad strength at 60 degree 90% or better LSI to demonstrate appropriate strength for straight link jogging   (Progressing, not met)  Pt reports 65% subjective improvement in function or better (Progressing, not met)     Long Term Goals: 10-12 weeks   Pt independent in d/c hep for maintenance of strength following cessation of formal therapy (Progressing, not met)  Isokinetic quad/hamstring strength 90% or better on all test to demonstrate appropriate limb symmetry for return to high level activity (Progressing, not met)  90% LSI on hop test to demonstrate appropriate limb control and power for return to high level activity (Progressing, not met)  Pt completes return to jogging program to demonstrate tolerance to repetitive joint loading for return to recreational exercise  (Progressing, not met)  Pt completes cutting, CoD program to demonstrate safety in all planes for return to recreational exercise (Progressing, not met)  Pt reports 90% or better subjective improvement in function (Progressing, not met)    PLAN     Progress quad control as able     Olivia Mari, PT, DPT

## 2024-06-20 ENCOUNTER — CLINICAL SUPPORT (OUTPATIENT)
Dept: REHABILITATION | Facility: HOSPITAL | Age: 39
End: 2024-06-20
Payer: COMMERCIAL

## 2024-06-20 DIAGNOSIS — R29.898 WEAKNESS OF LEFT LOWER EXTREMITY: ICD-10-CM

## 2024-06-20 DIAGNOSIS — M25.662 DECREASED RANGE OF MOTION OF LEFT KNEE: Primary | ICD-10-CM

## 2024-06-20 DIAGNOSIS — R26.9 GAIT ABNORMALITY: ICD-10-CM

## 2024-06-20 PROCEDURE — 97112 NEUROMUSCULAR REEDUCATION: CPT

## 2024-06-20 PROCEDURE — 97530 THERAPEUTIC ACTIVITIES: CPT

## 2024-06-20 PROCEDURE — 97110 THERAPEUTIC EXERCISES: CPT

## 2024-06-20 NOTE — PROGRESS NOTES
"[facility]   Physical Therapy Treatment Note     [facility]  Clinic Number: [id number]    Therapy Diagnosis:   Encounter Diagnoses   Name Primary?    Decreased range of motion of left knee Yes    Weakness of left lower extremity     Gait abnormality      Physician: Alonso Macias III, *    Visit Date: 6/20/2024  Physician Orders: PT Eval and Treat  Medical Diagnosis from Referral: S83.232D (ICD-10-CM) - Complex tear of medial meniscus of left knee as current injury, subsequent encounter   Evaluation Date: 6/7/2024  Authorization Period Expiration: 06/03/2024  Plan of Care Expiration: 08/30/2024  Progress Note Due: 07/05/2024  Visit # / Visits authorized: 4/ 20 (+eval)  FOTO Follow Up: #1 given at eval  FOTO Follow UP:      Time In: 0600 pm  Time Out: 0655 pm  Total Billable Time: 55 minutes     DOS: 06/06/2024  S/p: left  1. knee arthroscopic medial meniscectomy   2. knee arthroscopic chondroplasty   3. knee arthroscopic partial synovectomy/debridement.   4. knee arthroscopic plica excision.   5. knee arthroscopic lysis of adhesions  Post-Op Precautions: s/p meniscectomy      Precautions: Standard and post-op    SUBJECTIVE     Pt reports: he can tell his knee still gets tired after prolonged walking  He was compliant with home exercise program.  Response to previous treatment: improved knee mobility  Functional change: improved gait with crutches    Pain: 2/10  Location: left knee      OBJECTIVE     Objective Measures updated at progress report unless specified.     Left knee PROM: 10-0-125    1 week and 4 days as of 6/17    Treatment       Adria received the treatments listed below:      THERAPEUTIC EXERCISES to develop strength, endurance, ROM, flexibility, posture, and core stabilization for 10 minutes including:  SL hip abd + ext; 3 x 10  DL bridges; GTB; 3 x 10 x 3"       MANUAL THERAPY TECHNIQUES including Joint mobilizations and Soft tissue Mobilization were " "applied to L knee for 05 minutes.  Fat pad mob at 0 and 20 deg flex  Superior patellar glide Gr 2-3    NEUROMUSCULAR RE-EDUCATION ACTIVITIES to improve Balance, Coordination, Kinesthetic, Sense, Proprioception, and Posture for 25 minutes.  The following were included:   Quad set w/ AAROM strap; 10 x 10"   Quad set 20 x 5"  SLR 2 x 15  LAQ; GTB 2 x 15    THERAPEUTIC ACTIVITIES to improve dynamic and functional performance for 20 minutes including.  Upright Bike; Lv 4.0 for 10 min for CV endurance and tissue extensibility  Shuttle SL Leg Press; 50#; 3 x 10     GAIT TRAINING to improve functional mobility and safety for 00 minutes.           Patient Education and Home Exercises     Home Exercises Provided and Patient Education Provided     Education provided:   - continue HEP, add SLR    Written Home Exercises Provided: Patient instructed to cont prior HEP. Exercises were reviewed and Adria was able to demonstrate them prior to the end of the session.  Adria demonstrated good  understanding of the education provided. See EMR under Patient Instructions for exercises provided during therapy sessions    ASSESSMENT     Adria is progressing well in quad control and endurance. He continues to have some difficulty with active hyperextension at start of session but this improves quickly with strap assist. He will benefit continued progression of quad and hip activation and progressive loading as able.     Adria Is progressing well towards his goals.   Pt prognosis is Good.     Pt will continue to benefit from skilled outpatient physical therapy to address the deficits listed in the problem list box on initial evaluation, provide pt/family education and to maximize pt's level of independence in the home and community environment.     Pt's spiritual, cultural and educational needs considered and pt agreeable to plan of care and goals.     Anticipated barriers to physical therapy: none    Goals:   Short Term Goals:2- 4 weeks   Pt " independent in initial hep (Progressing, not met)  Pain 0-2/10 at worst (Progressing, not met)  Full active hyper extension, flexion 120 degrees or better (Progressing, not met)  Amb without deviations in community (Progressing, not met)  Pt reports 25% improvement in function or better (Progressing, not met)     Mid Term Goals: 6-8 weeks  Pt will report 0/10 pain to improve independence in ADLs (Progressing, not met)  Patient will demonstrate and maintain full, symmetric knee ROM to facilitate gait and squats. (Progressing, not met)  Anterior Y balance less than 4 cm difference to demonstrate satisfactory dynamic postural control to begin straight line jogging (Progressing, not met)  Eight inch Forward step down test pass with no deviations to demonstrate good eccentric quad control to progress to straight line jogging (Progressing, not met)  Isometric quad strength at 60 degree 90% or better LSI to demonstrate appropriate strength for straight link jogging   (Progressing, not met)  Pt reports 65% subjective improvement in function or better (Progressing, not met)     Long Term Goals: 10-12 weeks   Pt independent in d/c hep for maintenance of strength following cessation of formal therapy (Progressing, not met)  Isokinetic quad/hamstring strength 90% or better on all test to demonstrate appropriate limb symmetry for return to high level activity (Progressing, not met)  90% LSI on hop test to demonstrate appropriate limb control and power for return to high level activity (Progressing, not met)  Pt completes return to jogging program to demonstrate tolerance to repetitive joint loading for return to recreational exercise  (Progressing, not met)  Pt completes cutting, CoD program to demonstrate safety in all planes for return to recreational exercise (Progressing, not met)  Pt reports 90% or better subjective improvement in function (Progressing, not met)    PLAN     Progress quad control as able     Olivia Mari,  PT, DPT

## 2024-06-21 ENCOUNTER — OFFICE VISIT (OUTPATIENT)
Dept: SPORTS MEDICINE | Facility: CLINIC | Age: 39
End: 2024-06-21
Payer: COMMERCIAL

## 2024-06-21 VITALS
WEIGHT: 180.75 LBS | BODY MASS INDEX: 27.49 KG/M2 | SYSTOLIC BLOOD PRESSURE: 105 MMHG | DIASTOLIC BLOOD PRESSURE: 64 MMHG | HEART RATE: 56 BPM

## 2024-06-21 DIAGNOSIS — Z98.890 S/P ARTHROSCOPY OF LEFT KNEE: Primary | ICD-10-CM

## 2024-06-21 PROCEDURE — 99999 PR PBB SHADOW E&M-EST. PATIENT-LVL III: CPT | Mod: PBBFAC,,, | Performed by: PHYSICIAN ASSISTANT

## 2024-06-21 NOTE — PROGRESS NOTES
CC: left knee pain    History of present illness: Pt is here today for post-operative followup of knee arthroscopy.  he is doing well.  We have reviewed his findings and discussed plan of care and future treatment options.       He is doing well.   Pain at 0/10.   Not taking any pain medications. Doing PT.   Still using 1 crutch out of precaution.     DATE OF PROCEDURE: 06/10/2024  SURGEON:  Ivelisse Cano M.D  PROCEDURE PERFORMED:   left  1. knee arthroscopic medial meniscectomy   2. knee arthroscopic chondroplasty   3. knee arthroscopic partial synovectomy/debridement.   4. knee arthroscopic plica excision.   5. knee arthroscopic lysis of adhesions    In the patellofemoral compartment, there was chondral damage to:  Patella 20 x 20 mm grade 2  Chondroplasty was performed using arthroscopic shaver.                                                                               PHYSICAL EXAMINATION:     Incision sites healed well  No evidence of any erythema, infection or induration  Range of motion 0-120 degrees  Minimal effusion  2+ DP pulse  No swelling, no calf tenderness  - Blanche's sign  Negative medial joint line tenderness  Moderate quad atrophy                                                                                 ASSESSMENT:                                                                                                                                               1. Status post above, doing well.                                                                                                                               PLAN:                                                                                                                                                     1. Continue with PT. Can wean crutch over the next 1 week.   2. Emphasized quad function.  3. I have discussed return to activity in detail.  4.Patient will see us back in 4 weeks                                    5. Discussed case  with PT.   All questions were answered, surgical technique was reviewed and interpreted, and patient should contact us with any questions or concerns in the interim.

## 2024-06-24 ENCOUNTER — OFFICE VISIT (OUTPATIENT)
Dept: INTERNAL MEDICINE | Facility: CLINIC | Age: 39
End: 2024-06-24
Payer: COMMERCIAL

## 2024-06-24 ENCOUNTER — LAB VISIT (OUTPATIENT)
Dept: LAB | Facility: HOSPITAL | Age: 39
End: 2024-06-24
Attending: STUDENT IN AN ORGANIZED HEALTH CARE EDUCATION/TRAINING PROGRAM
Payer: COMMERCIAL

## 2024-06-24 VITALS
DIASTOLIC BLOOD PRESSURE: 86 MMHG | SYSTOLIC BLOOD PRESSURE: 116 MMHG | BODY MASS INDEX: 26.94 KG/M2 | TEMPERATURE: 98 F | OXYGEN SATURATION: 100 % | HEIGHT: 69 IN | RESPIRATION RATE: 16 BRPM | HEART RATE: 52 BPM | WEIGHT: 181.88 LBS

## 2024-06-24 DIAGNOSIS — R06.83 SNORING: ICD-10-CM

## 2024-06-24 DIAGNOSIS — R25.1 TREMOR OF BOTH HANDS: ICD-10-CM

## 2024-06-24 DIAGNOSIS — F41.9 ANXIETY: ICD-10-CM

## 2024-06-24 DIAGNOSIS — Z00.00 PHYSICAL EXAM, ANNUAL: Primary | ICD-10-CM

## 2024-06-24 DIAGNOSIS — Z82.49 FAMILY HISTORY OF CEREBRAL ANEURYSM: ICD-10-CM

## 2024-06-24 DIAGNOSIS — Z00.00 PHYSICAL EXAM, ANNUAL: ICD-10-CM

## 2024-06-24 LAB
25(OH)D3+25(OH)D2 SERPL-MCNC: 28 NG/ML (ref 30–96)
ALBUMIN SERPL BCP-MCNC: 4.1 G/DL (ref 3.5–5.2)
ALP SERPL-CCNC: 76 U/L (ref 55–135)
ALT SERPL W/O P-5'-P-CCNC: 35 U/L (ref 10–44)
ANION GAP SERPL CALC-SCNC: 8 MMOL/L (ref 8–16)
AST SERPL-CCNC: 25 U/L (ref 10–40)
BASOPHILS # BLD AUTO: 0.05 K/UL (ref 0–0.2)
BASOPHILS NFR BLD: 1 % (ref 0–1.9)
BILIRUB SERPL-MCNC: 0.5 MG/DL (ref 0.1–1)
BUN SERPL-MCNC: 15 MG/DL (ref 6–20)
CALCIUM SERPL-MCNC: 10 MG/DL (ref 8.7–10.5)
CHLORIDE SERPL-SCNC: 102 MMOL/L (ref 95–110)
CHOLEST SERPL-MCNC: 203 MG/DL (ref 120–199)
CHOLEST/HDLC SERPL: 3.6 {RATIO} (ref 2–5)
CO2 SERPL-SCNC: 25 MMOL/L (ref 23–29)
CREAT SERPL-MCNC: 0.9 MG/DL (ref 0.5–1.4)
DIFFERENTIAL METHOD BLD: ABNORMAL
EOSINOPHIL # BLD AUTO: 0.2 K/UL (ref 0–0.5)
EOSINOPHIL NFR BLD: 3.1 % (ref 0–8)
ERYTHROCYTE [DISTWIDTH] IN BLOOD BY AUTOMATED COUNT: 13 % (ref 11.5–14.5)
EST. GFR  (NO RACE VARIABLE): >60 ML/MIN/1.73 M^2
ESTIMATED AVG GLUCOSE: 105 MG/DL (ref 68–131)
GLUCOSE SERPL-MCNC: 89 MG/DL (ref 70–110)
HBA1C MFR BLD: 5.3 % (ref 4–5.6)
HCT VFR BLD AUTO: 41.5 % (ref 40–54)
HDLC SERPL-MCNC: 56 MG/DL (ref 40–75)
HDLC SERPL: 27.6 % (ref 20–50)
HGB BLD-MCNC: 13.4 G/DL (ref 14–18)
IMM GRANULOCYTES # BLD AUTO: 0.01 K/UL (ref 0–0.04)
IMM GRANULOCYTES NFR BLD AUTO: 0.2 % (ref 0–0.5)
LDLC SERPL CALC-MCNC: 130.2 MG/DL (ref 63–159)
LYMPHOCYTES # BLD AUTO: 2 K/UL (ref 1–4.8)
LYMPHOCYTES NFR BLD: 39 % (ref 18–48)
MCH RBC QN AUTO: 27.3 PG (ref 27–31)
MCHC RBC AUTO-ENTMCNC: 32.3 G/DL (ref 32–36)
MCV RBC AUTO: 85 FL (ref 82–98)
MONOCYTES # BLD AUTO: 0.4 K/UL (ref 0.3–1)
MONOCYTES NFR BLD: 6.9 % (ref 4–15)
NEUTROPHILS # BLD AUTO: 2.5 K/UL (ref 1.8–7.7)
NEUTROPHILS NFR BLD: 49.8 % (ref 38–73)
NONHDLC SERPL-MCNC: 147 MG/DL
NRBC BLD-RTO: 0 /100 WBC
PLATELET # BLD AUTO: 321 K/UL (ref 150–450)
PMV BLD AUTO: 10 FL (ref 9.2–12.9)
POTASSIUM SERPL-SCNC: 4.7 MMOL/L (ref 3.5–5.1)
PROT SERPL-MCNC: 7.6 G/DL (ref 6–8.4)
RBC # BLD AUTO: 4.9 M/UL (ref 4.6–6.2)
SODIUM SERPL-SCNC: 135 MMOL/L (ref 136–145)
T4 SERPL-MCNC: 7 UG/DL (ref 4.5–11.5)
TRIGL SERPL-MCNC: 84 MG/DL (ref 30–150)
TSH SERPL DL<=0.005 MIU/L-ACNC: 1.72 UIU/ML (ref 0.4–4)
WBC # BLD AUTO: 5.1 K/UL (ref 3.9–12.7)

## 2024-06-24 PROCEDURE — 82306 VITAMIN D 25 HYDROXY: CPT | Performed by: STUDENT IN AN ORGANIZED HEALTH CARE EDUCATION/TRAINING PROGRAM

## 2024-06-24 PROCEDURE — 85025 COMPLETE CBC W/AUTO DIFF WBC: CPT | Performed by: STUDENT IN AN ORGANIZED HEALTH CARE EDUCATION/TRAINING PROGRAM

## 2024-06-24 PROCEDURE — 80061 LIPID PANEL: CPT | Performed by: STUDENT IN AN ORGANIZED HEALTH CARE EDUCATION/TRAINING PROGRAM

## 2024-06-24 PROCEDURE — 84436 ASSAY OF TOTAL THYROXINE: CPT | Performed by: STUDENT IN AN ORGANIZED HEALTH CARE EDUCATION/TRAINING PROGRAM

## 2024-06-24 PROCEDURE — 36415 COLL VENOUS BLD VENIPUNCTURE: CPT | Mod: PO | Performed by: STUDENT IN AN ORGANIZED HEALTH CARE EDUCATION/TRAINING PROGRAM

## 2024-06-24 PROCEDURE — 3074F SYST BP LT 130 MM HG: CPT | Mod: CPTII,S$GLB,, | Performed by: STUDENT IN AN ORGANIZED HEALTH CARE EDUCATION/TRAINING PROGRAM

## 2024-06-24 PROCEDURE — 99214 OFFICE O/P EST MOD 30 MIN: CPT | Mod: 25,S$GLB,, | Performed by: STUDENT IN AN ORGANIZED HEALTH CARE EDUCATION/TRAINING PROGRAM

## 2024-06-24 PROCEDURE — 83036 HEMOGLOBIN GLYCOSYLATED A1C: CPT | Performed by: STUDENT IN AN ORGANIZED HEALTH CARE EDUCATION/TRAINING PROGRAM

## 2024-06-24 PROCEDURE — 99999 PR PBB SHADOW E&M-EST. PATIENT-LVL V: CPT | Mod: PBBFAC,,, | Performed by: STUDENT IN AN ORGANIZED HEALTH CARE EDUCATION/TRAINING PROGRAM

## 2024-06-24 PROCEDURE — 80053 COMPREHEN METABOLIC PANEL: CPT | Performed by: STUDENT IN AN ORGANIZED HEALTH CARE EDUCATION/TRAINING PROGRAM

## 2024-06-24 PROCEDURE — 90715 TDAP VACCINE 7 YRS/> IM: CPT | Mod: S$GLB,,, | Performed by: STUDENT IN AN ORGANIZED HEALTH CARE EDUCATION/TRAINING PROGRAM

## 2024-06-24 PROCEDURE — 84443 ASSAY THYROID STIM HORMONE: CPT | Performed by: STUDENT IN AN ORGANIZED HEALTH CARE EDUCATION/TRAINING PROGRAM

## 2024-06-24 PROCEDURE — 3079F DIAST BP 80-89 MM HG: CPT | Mod: CPTII,S$GLB,, | Performed by: STUDENT IN AN ORGANIZED HEALTH CARE EDUCATION/TRAINING PROGRAM

## 2024-06-24 PROCEDURE — 1159F MED LIST DOCD IN RCRD: CPT | Mod: CPTII,S$GLB,, | Performed by: STUDENT IN AN ORGANIZED HEALTH CARE EDUCATION/TRAINING PROGRAM

## 2024-06-24 PROCEDURE — 3008F BODY MASS INDEX DOCD: CPT | Mod: CPTII,S$GLB,, | Performed by: STUDENT IN AN ORGANIZED HEALTH CARE EDUCATION/TRAINING PROGRAM

## 2024-06-24 PROCEDURE — 99395 PREV VISIT EST AGE 18-39: CPT | Mod: 25,S$GLB,, | Performed by: STUDENT IN AN ORGANIZED HEALTH CARE EDUCATION/TRAINING PROGRAM

## 2024-06-24 PROCEDURE — 90471 IMMUNIZATION ADMIN: CPT | Mod: S$GLB,,, | Performed by: STUDENT IN AN ORGANIZED HEALTH CARE EDUCATION/TRAINING PROGRAM

## 2024-06-24 NOTE — PROGRESS NOTES
Subjective:         Chief Complaint: Establish Care    HPI   Jose Frost is a 39 y.o. man with recent history of left knee meniscal tear presenting as a new patient to establish/transition primary care and for acute issues as below; as these are different appointment types, legality dictates they be billed/coded accordingly:      Family history of ruptured cerebral aneurysm:  - cause of his father's sudden death in his early 40s  - recently, has developed a bilateral (left-greater-than-right) upper extremity intention tremor    Anxiety:  - self appreciated   - no interested in pharmacotherapy  - is interested in establishing with a cognitive behavioral therapist    Snoring:  - significant other has expressed concern for underlying sleep apnea (denies symptoms consistent with paroxysmal nocturnal dyspnea)        Family, social, surgical Hx reviewed     Health Maintenance:  Due for annual screening labs and routine vaccinations.      Past Medical History:   Diagnosis Date    Mild intermittent asthma, uncomplicated      Past Surgical History:   Procedure Laterality Date    CHONDROPLASTY OF KNEE Left 6/6/2024    Procedure: CHONDROPLASTY, KNEE;  Surgeon: Ivelisse Cano MD;  Location: [facility] OR;  Service: Orthopedics;  Laterality: Left;    KNEE ARTHROSCOPY W/ MENISCECTOMY Left 6/6/2024    Procedure: ARTHROSCOPY, KNEE, WITH MENISCECTOMY;  Surgeon: Ivelisse Cano MD;  Location: [facility] OR;  Service: Orthopedics;  Laterality: Left;    KNEE DEBRIDEMENT  6/6/2024    Procedure: DEBRIDEMENT, KNEE;  Surgeon: Ivelisse Cano MD;  Location: [facility] OR;  Service: Orthopedics;;    LYSIS, ADHESIONS, KNEE, ARTHROSCOPIC  6/6/2024    Procedure: LYSIS, ADHESIONS, KNEE, ARTHROSCOPIC;  Surgeon: Ivelisse Cano MD;  Location: [facility] OR;  Service: Orthopedics;;    SYNOVECTOMY OF KNEE  6/6/2024    Procedure: SYNOVECTOMY, KNEE;  Surgeon: Ivelisse Cano MD;  Location: [facility] OR;  Service: Orthopedics;;    TONSILLECTOMY  1995     Family History   Problem  Relation Name Age of Onset    Asthma Mother Tanika Frost     Hypertension Mother Tanika Frost     Early death Father Aaron Frost         due to Hemorrhagic Stroke at 42.    Stroke Father Aaron Frost         Passed away from Hemorrhagic stroke at 42.    Heart attack Maternal Grandfather      Parkinsonism Maternal Uncle      Lung cancer Neg Hx      Colon cancer Neg Hx      Prostate cancer Neg Hx       Social History     Socioeconomic History    Marital status:    Tobacco Use    Smoking status: Never    Smokeless tobacco: Never   Substance and Sexual Activity    Alcohol use: Yes     Comment: about 2 drink in an average week    Drug use: Never    Sexual activity: Yes     Partners: Female     Birth control/protection: Condom     Review of patient's allergies indicates:  No Known Allergies  We administered (diphth,pertus(acell),tetanus).   Review of Systems   Constitutional:  Negative for appetite change, chills and fever.   HENT: Negative.     Respiratory:  Negative for cough, chest tightness and shortness of breath.    Cardiovascular:  Negative for chest pain, palpitations and leg swelling.   Gastrointestinal:  Negative for abdominal distention, abdominal pain, blood in stool, constipation, diarrhea, nausea and vomiting.   Endocrine: Negative.    Genitourinary:  Negative for difficulty urinating, dysuria, frequency and hematuria.   Musculoskeletal: Negative.    Integumentary:  Negative.   Neurological:  Positive for tremors.   Psychiatric/Behavioral: Negative.           Objective:      Vitals:    06/24/24 0912   BP: 116/86   Pulse: (!) 52   Resp: 16   Temp: 98.2 °F (36.8 °C)      Physical Exam  Vitals reviewed.   Constitutional:       General: He is not in acute distress.     Appearance: Normal appearance.   HENT:      Head: Normocephalic and atraumatic.      Comments: Facial features are symmetric      Nose: Nose normal. No congestion or rhinorrhea.      Mouth/Throat:       Mouth: Mucous membranes are moist.      Pharynx: Oropharynx is clear. No oropharyngeal exudate or posterior oropharyngeal erythema.   Eyes:      General: No scleral icterus.     Extraocular Movements: Extraocular movements intact.      Conjunctiva/sclera: Conjunctivae normal.   Cardiovascular:      Rate and Rhythm: Normal rate and regular rhythm.      Pulses: Normal pulses.      Heart sounds: Normal heart sounds.   Pulmonary:      Effort: Pulmonary effort is normal. No respiratory distress.      Breath sounds: Normal breath sounds.   Musculoskeletal:         General: No deformity or signs of injury. Normal range of motion.      Cervical back: Normal range of motion.      Comments: Gait normal    Skin:     General: Skin is warm and dry.      Findings: No rash.   Neurological:      General: No focal deficit present.      Mental Status: He is alert and oriented to person, place, and time. Mental status is at baseline.   Psychiatric:         Mood and Affect: Mood normal.         Behavior: Behavior normal.         Thought Content: Thought content normal.       Current Outpatient Medications on File Prior to Visit   Medication Sig Dispense Refill    aspirin (ECOTRIN) 81 MG EC tablet Take 1 tablet (81 mg total) by mouth once daily. For 4 weeks starting the day after surgery. 28 tablet 0    multivitamin (THERAGRAN) per tablet Take 1 tablet by mouth once daily.      acetaminophen (TYLENOL) 500 MG tablet Take 500 mg by mouth every 6 (six) hours as needed for Pain.      HYDROcodone-acetaminophen (NORCO)  mg per tablet Take 1 tablet by mouth every 4-6 hours for pain. (Patient not taking: Reported on 6/21/2024) 12 tablet 0    promethazine (PHENERGAN) 25 MG tablet Take 1 tablet (25 mg total) by mouth every 6 (six) hours as needed for Nausea. 8 tablet 0    traMADoL (ULTRAM) 50 mg tablet Take 1-2 tablets ( mg total) by mouth every 6 (six) hours as needed for Pain. (Patient not taking: Reported on 6/21/2024) 21 tablet  0     No current facility-administered medications on file prior to visit.         Assessment:       1. Physical exam, annual    2. Family history of cerebral aneurysm    3. Tremor of both hands    4. Anxiety    5. Snoring        Plan:       Physical exam, annual  -     CBC Auto Differential; Future; Expected date: 06/24/2024  -     Comprehensive Metabolic Panel; Future; Expected date: 06/24/2024  -     Hemoglobin A1C; Future; Expected date: 06/24/2024  -     Lipid Panel; Future; Expected date: 06/24/2024  -     T4; Future; Expected date: 06/24/2024  -     TSH; Future; Expected date: 06/24/2024  -     Vitamin D; Future; Expected date: 06/24/2024   - baseline/annual screening labs ordered.     - Primary care assumed   - Tdap updated    Family history of cerebral aneurysm  Tremor of both hands  -     MRA Brain with and without contrast; Future; Expected date: 06/24/2024   - given concerning family history (in addition to new upper extremity tremor) will facilitate baseline MRI brain to screen for a developing aneurysm and treat.      Anxiety  -     Ambulatory referral/consult to Psychology; Future; Expected date: 07/01/2024   - can consider pharmacotherapy options in the future if he becomes amenable   - will facilitate psychology establishment for cognitive behavioral therapy; recommendations and interventions appreciated     Snoring  -     Ambulatory referral/consult to Sleep Disorders; Future; Expected date: 07/01/2024   - low to moderate pretest probability for obstructive sleep apnea; per patient preference, will facilitate formal rule out; recommendations and interventions appreciated     Other orders  -     diphth, pertus (ACEL), tet vac (PF) (ADULT)

## 2024-06-25 ENCOUNTER — CLINICAL SUPPORT (OUTPATIENT)
Dept: REHABILITATION | Facility: HOSPITAL | Age: 39
End: 2024-06-25
Payer: COMMERCIAL

## 2024-06-25 DIAGNOSIS — R26.9 GAIT ABNORMALITY: ICD-10-CM

## 2024-06-25 DIAGNOSIS — M25.662 DECREASED RANGE OF MOTION OF LEFT KNEE: Primary | ICD-10-CM

## 2024-06-25 DIAGNOSIS — R29.898 WEAKNESS OF LEFT LOWER EXTREMITY: ICD-10-CM

## 2024-06-25 PROCEDURE — 97112 NEUROMUSCULAR REEDUCATION: CPT

## 2024-06-25 PROCEDURE — 97530 THERAPEUTIC ACTIVITIES: CPT

## 2024-06-25 PROCEDURE — 97110 THERAPEUTIC EXERCISES: CPT

## 2024-06-25 NOTE — PROGRESS NOTES
"[facility]   Physical Therapy Treatment Note     [facility]  Clinic Number: [id number]    Therapy Diagnosis:   Encounter Diagnoses   Name Primary?    Decreased range of motion of left knee Yes    Weakness of left lower extremity     Gait abnormality        Physician: Alonso Macias III, *    Visit Date: 6/25/2024  Physician Orders: PT Eval and Treat  Medical Diagnosis from Referral: S83.232D (ICD-10-CM) - Complex tear of medial meniscus of left knee as current injury, subsequent encounter   Evaluation Date: 6/7/2024  Authorization Period Expiration: 06/03/2024  Plan of Care Expiration: 08/30/2024  Progress Note Due: 07/05/2024  Visit # / Visits authorized: 5/ 20 (+eval)  FOTO Follow Up: #1 given at [facility]  FOTO Follow UP:      Time In: 0105 pm  Time Out: 0200 pm  Total Billable Time: 55 minutes     DOS: 06/06/2024  S/p: left  1. knee arthroscopic medial meniscectomy   2. knee arthroscopic chondroplasty   3. knee arthroscopic partial synovectomy/debridement.   4. knee arthroscopic plica excision.   5. knee arthroscopic lysis of adhesions  Post-Op Precautions: s/p meniscectomy      Precautions: Standard and post-op    SUBJECTIVE     Pt reports: he feels like his endurance is getting better  He was compliant with home exercise program.  Response to previous treatment: improved knee mobility  Functional change: improved gait with crutches    Pain: 2/10  Location: left knee      OBJECTIVE     Objective Measures updated at progress report unless specified.     Left knee PROM: 10-0-125    2 week and 5 days as of 6/25    Treatment       Adria received the treatments listed below:      THERAPEUTIC EXERCISES to develop strength, endurance, ROM, flexibility, posture, and core stabilization for 20 minutes including:  SL clamshell; 3 x 10 x 5"   DL bridges; GTB; 3 x 10 x 3"       MANUAL THERAPY TECHNIQUES including Joint mobilizations and Soft tissue Mobilization were applied to L " "knee for 00 minutes.  Fat pad mob at 0 and 20 deg flex  Superior patellar glide Gr 2-3    NEUROMUSCULAR RE-EDUCATION ACTIVITIES to improve Balance, Coordination, Kinesthetic, Sense, Proprioception, and Posture for 10 minutes.  The following were included:   SLR 30x  LAQ; GTB 2 x 15    THERAPEUTIC ACTIVITIES to improve dynamic and functional performance for 30 minutes including.  Upright Bike; Lv 4.0 for 10 min for CV endurance and tissue extensibility  Step up; 6"; 3 x 10  Box squat; GTB; 3 x 10  Shuttle DL Leg Press; 112.5#; 3 x 15    GAIT TRAINING to improve functional mobility and safety for 00 minutes.           Patient Education and Home Exercises     Home Exercises Provided and Patient Education Provided     Education provided:   - continue HEP, add SLR    Written Home Exercises Provided: Patient instructed to cont prior HEP. Exercises were reviewed and Adria was able to demonstrate them prior to the end of the session.  Adria demonstrated good  understanding of the education provided. See EMR under Patient Instructions for exercises provided during therapy sessions    ASSESSMENT     Adria demonstrated good quad control in SLR and is appropriate to transition off single crutch but educated on possible need if walking prolonged distances. He is progressing well in CKC activity and will benefit continued loading within joint irritability tolerance. Will intro OKC next week.     Adria Is progressing well towards his goals.   Pt prognosis is Good.     Pt will continue to benefit from skilled outpatient physical therapy to address the deficits listed in the problem list box on initial evaluation, provide pt/family education and to maximize pt's level of independence in the home and community environment.     Pt's spiritual, cultural and educational needs considered and pt agreeable to plan of care and goals.     Anticipated barriers to physical therapy: none    Goals:   Short Term Goals:2- 4 weeks   Pt independent " in initial hep (Progressing, not met)  Pain 0-2/10 at worst (Progressing, not met)  Full active hyper extension, flexion 120 degrees or better (Progressing, not met)  Amb without deviations in community (Progressing, not met)  Pt reports 25% improvement in function or better (Progressing, not met)     Mid Term Goals: 6-8 weeks  Pt will report 0/10 pain to improve independence in ADLs (Progressing, not met)  Patient will demonstrate and maintain full, symmetric knee ROM to facilitate gait and squats. (Progressing, not met)  Anterior Y balance less than 4 cm difference to demonstrate satisfactory dynamic postural control to begin straight line jogging (Progressing, not met)  Eight inch Forward step down test pass with no deviations to demonstrate good eccentric quad control to progress to straight line jogging (Progressing, not met)  Isometric quad strength at 60 degree 90% or better LSI to demonstrate appropriate strength for straight link jogging   (Progressing, not met)  Pt reports 65% subjective improvement in function or better (Progressing, not met)     Long Term Goals: 10-12 weeks   Pt independent in d/c hep for maintenance of strength following cessation of formal therapy (Progressing, not met)  Isokinetic quad/hamstring strength 90% or better on all test to demonstrate appropriate limb symmetry for return to high level activity (Progressing, not met)  90% LSI on hop test to demonstrate appropriate limb control and power for return to high level activity (Progressing, not met)  Pt completes return to jogging program to demonstrate tolerance to repetitive joint loading for return to recreational exercise  (Progressing, not met)  Pt completes cutting, CoD program to demonstrate safety in all planes for return to recreational exercise (Progressing, not met)  Pt reports 90% or better subjective improvement in function (Progressing, not met)    PLAN     Progress quad control as able     Olivia Mari, PT,  DPT

## 2024-06-27 ENCOUNTER — CLINICAL SUPPORT (OUTPATIENT)
Dept: REHABILITATION | Facility: HOSPITAL | Age: 39
End: 2024-06-27
Payer: COMMERCIAL

## 2024-06-27 DIAGNOSIS — M25.662 DECREASED RANGE OF MOTION OF LEFT KNEE: Primary | ICD-10-CM

## 2024-06-27 DIAGNOSIS — R26.9 GAIT ABNORMALITY: ICD-10-CM

## 2024-06-27 DIAGNOSIS — R29.898 WEAKNESS OF LEFT LOWER EXTREMITY: ICD-10-CM

## 2024-06-27 PROCEDURE — 97110 THERAPEUTIC EXERCISES: CPT

## 2024-06-27 PROCEDURE — 97530 THERAPEUTIC ACTIVITIES: CPT

## 2024-06-27 NOTE — PROGRESS NOTES
"[facility]   Physical Therapy Treatment Note     [facility]  Clinic Number: [id number]    Therapy Diagnosis:   Encounter Diagnoses   Name Primary?    Decreased range of motion of left knee Yes    Weakness of left lower extremity     Gait abnormality        Physician: Alonso Macias III, *    Visit Date: 6/27/2024  Physician Orders: PT Eval and Treat  Medical Diagnosis from Referral: S83.232D (ICD-10-CM) - Complex tear of medial meniscus of left knee as current injury, subsequent encounter   Evaluation Date: 6/7/2024  Authorization Period Expiration: 06/03/2024  Plan of Care Expiration: 08/30/2024  Progress Note Due: 07/05/2024  Visit # / Visits authorized: 6/ 20 (+eval)  FOTO Follow Up: #1 given at [facility]  FOTO Follow UP:      Time In: 0600 pm  Time Out: 0655 pm  Total Billable Time: 55 minutes     DOS: 06/06/2024  S/p: left  1. knee arthroscopic medial meniscectomy   2. knee arthroscopic chondroplasty   3. knee arthroscopic partial synovectomy/debridement.   4. knee arthroscopic plica excision.   5. knee arthroscopic lysis of adhesions  Post-Op Precautions: s/p meniscectomy      Precautions: Standard and post-op    SUBJECTIVE     Pt reports:knee has been feeling good without a crutch  He was compliant with home exercise program.  Response to previous treatment: improved knee mobility  Functional change: improved gait with crutches    Pain: 2/10  Location: left knee      OBJECTIVE     Objective Measures updated at progress report unless specified.     Left knee PROM: 10-0-125    3 week as of 6/27    Treatment       Adria received the treatments listed below:      THERAPEUTIC EXERCISES to develop strength, endurance, ROM, flexibility, posture, and core stabilization for 25 minutes including:  SL clamshell; 3 x 10 x 5"   DL bridges; GTB; 3 x 10 x 3"   Lateral band walks; GTB; 2 x turf       MANUAL THERAPY TECHNIQUES including Joint mobilizations and Soft tissue Mobilization " "were applied to L knee for 00 minutes.  Fat pad mob at 0 and 20 deg flex  Superior patellar glide Gr 2-3    NEUROMUSCULAR RE-EDUCATION ACTIVITIES to improve Balance, Coordination, Kinesthetic, Sense, Proprioception, and Posture for 05 minutes.  The following were included:   SLR 30x  LAQ; GTB 2 x 15- NP    THERAPEUTIC ACTIVITIES to improve dynamic and functional performance for 25 minutes including.  Upright Bike; Lv 4.0 for 10 min for CV endurance and tissue extensibility  Split squat; 3 x 10 B  Step up; 8"; 15#; 3 x 12  Box squat; GTB; 3 x 10- NP  Shuttle SL Leg Press; 75#; 4 x 10    GAIT TRAINING to improve functional mobility and safety for 00 minutes.           Patient Education and Home Exercises     Home Exercises Provided and Patient Education Provided     Education provided:   - continue HEP, add SLR    Written Home Exercises Provided: Patient instructed to cont prior HEP. Exercises were reviewed and Adria was able to demonstrate them prior to the end of the session.  Adria demonstrated good  understanding of the education provided. See EMR under Patient Instructions for exercises provided during therapy sessions    ASSESSMENT     Adria is progressing well in CKC tolerance and increased loading today. He struggled with balance in split squat and will benefit increased motor control intervention to that end. Will begin OKC next session     Adria Is progressing well towards his goals.   Pt prognosis is Good.     Pt will continue to benefit from skilled outpatient physical therapy to address the deficits listed in the problem list box on initial evaluation, provide pt/family education and to maximize pt's level of independence in the home and community environment.     Pt's spiritual, cultural and educational needs considered and pt agreeable to plan of care and goals.     Anticipated barriers to physical therapy: none    Goals:   Short Term Goals:2- 4 weeks   Pt independent in initial hep (Progressing, not " met)  Pain 0-2/10 at worst (Progressing, not met)  Full active hyper extension, flexion 120 degrees or better (Progressing, not met)  Amb without deviations in community (Progressing, not met)  Pt reports 25% improvement in function or better (Progressing, not met)     Mid Term Goals: 6-8 weeks  Pt will report 0/10 pain to improve independence in ADLs (Progressing, not met)  Patient will demonstrate and maintain full, symmetric knee ROM to facilitate gait and squats. (Progressing, not met)  Anterior Y balance less than 4 cm difference to demonstrate satisfactory dynamic postural control to begin straight line jogging (Progressing, not met)  Eight inch Forward step down test pass with no deviations to demonstrate good eccentric quad control to progress to straight line jogging (Progressing, not met)  Isometric quad strength at 60 degree 90% or better LSI to demonstrate appropriate strength for straight link jogging   (Progressing, not met)  Pt reports 65% subjective improvement in function or better (Progressing, not met)     Long Term Goals: 10-12 weeks   Pt independent in d/c hep for maintenance of strength following cessation of formal therapy (Progressing, not met)  Isokinetic quad/hamstring strength 90% or better on all test to demonstrate appropriate limb symmetry for return to high level activity (Progressing, not met)  90% LSI on hop test to demonstrate appropriate limb control and power for return to high level activity (Progressing, not met)  Pt completes return to jogging program to demonstrate tolerance to repetitive joint loading for return to recreational exercise  (Progressing, not met)  Pt completes cutting, CoD program to demonstrate safety in all planes for return to recreational exercise (Progressing, not met)  Pt reports 90% or better subjective improvement in function (Progressing, not met)    PLAN     Progress quad control as able     Olivia Mari, PT, DPT

## 2024-07-03 ENCOUNTER — CLINICAL SUPPORT (OUTPATIENT)
Dept: REHABILITATION | Facility: HOSPITAL | Age: 39
End: 2024-07-03
Payer: COMMERCIAL

## 2024-07-03 DIAGNOSIS — R29.898 WEAKNESS OF LEFT LOWER EXTREMITY: ICD-10-CM

## 2024-07-03 DIAGNOSIS — R26.9 GAIT ABNORMALITY: ICD-10-CM

## 2024-07-03 DIAGNOSIS — M25.662 DECREASED RANGE OF MOTION OF LEFT KNEE: Primary | ICD-10-CM

## 2024-07-03 PROCEDURE — 97530 THERAPEUTIC ACTIVITIES: CPT

## 2024-07-03 PROCEDURE — 97110 THERAPEUTIC EXERCISES: CPT

## 2024-07-03 NOTE — PROGRESS NOTES
"[facility]   Physical Therapy Treatment Note     Name: Jose Covington [facility]  Clinic Number: [id number]    Therapy Diagnosis:   Encounter Diagnoses   Name Primary?    Decreased range of motion of left knee Yes    Weakness of left lower extremity     Gait abnormality        Physician: Alonso Macias III, *    Visit Date: 7/3/2024  Physician Orders: PT Eval and Treat  Medical Diagnosis from Referral: S83.232D (ICD-10-CM) - Complex tear of medial meniscus of left knee as current injury, subsequent encounter   Evaluation Date: 6/7/2024  Authorization Period Expiration: 06/03/2024  Plan of Care Expiration: 08/30/2024  Progress Note Due: 07/05/2024  Visit # / Visits authorized: 7/ 20 (+eval)  FOTO Follow Up: #1 given at eval  FOTO Follow UP:      Time In: 0442 pm (pt late)  Time Out: 0530 pm  Total Billable Time: 48 minutes     DOS: 06/06/2024  S/p: left  1. knee arthroscopic medial meniscectomy   2. knee arthroscopic chondroplasty   3. knee arthroscopic partial synovectomy/debridement.   4. knee arthroscopic plica excision.   5. knee arthroscopic lysis of adhesions  Post-Op Precautions: s/p meniscectomy      Precautions: Standard and post-op    SUBJECTIVE     Pt reports: knee is feeling good, no issues  He was compliant with home exercise program.  Response to previous treatment: improved knee mobility  Functional change: improved gait with crutches    Pain: 1/10  Location: left knee      OBJECTIVE     Objective Measures updated at progress report unless specified.     Left knee PROM: 10-0-125    3 weeks and 6 days as of 7/8    Treatment       Adria received the treatments listed below:      THERAPEUTIC EXERCISES to develop strength, endurance, ROM, flexibility, posture, and core stabilization for 23 minutes including:  Hammer Knee Ext 10#; 3 x 12  Lateral band walks; GTB; 2 x turf     NP Today:   SL clamshell; 3 x 10 x 5"   DL bridges; GTB; 3 x 10 x 3"       MANUAL THERAPY TECHNIQUES " "including Joint mobilizations and Soft tissue Mobilization were applied to L knee for 00 minutes.  Fat pad mob at 0 and 20 deg flex  Superior patellar glide Gr 2-3    NEUROMUSCULAR RE-EDUCATION ACTIVITIES to improve Balance, Coordination, Kinesthetic, Sense, Proprioception, and Posture for 00 minutes.  The following were included:   SLR 30x  LAQ; GTB 2 x 15- NP    THERAPEUTIC ACTIVITIES to improve dynamic and functional performance for 25 minutes including.  Upright Bike; Lv 4.0 for 10 min for CV endurance and tissue extensibility  Step up; 8"; 3 x 15  RFESS; 3 x 10 B      NP Today:   Box squat; GTB; 3 x 10  Shuttle SL Leg Press; 75#; 4 x 10    GAIT TRAINING to improve functional mobility and safety for 00 minutes.           Patient Education and Home Exercises     Home Exercises Provided and Patient Education Provided     Education provided:   - continue HEP, add SLR    Written Home Exercises Provided: Patient instructed to cont prior HEP. Exercises were reviewed and Adria was able to demonstrate them prior to the end of the session.  Adria demonstrated good  understanding of the education provided. See EMR under Patient Instructions for exercises provided during therapy sessions    ASSESSMENT     Adria improved in his tolerance to SL loading today with good balance and control. He also responded well to OKC loading today with notable fatigue but no adverse response. Will continue to progress as able.   Adria Is progressing well towards his goals.   Pt prognosis is Good.     Pt will continue to benefit from skilled outpatient physical therapy to address the deficits listed in the problem list box on initial evaluation, provide pt/family education and to maximize pt's level of independence in the home and community environment.     Pt's spiritual, cultural and educational needs considered and pt agreeable to plan of care and goals.     Anticipated barriers to physical therapy: none    Goals:   Short Term Goals:2- 4 " weeks   Pt independent in initial hep (Progressing, not met)  Pain 0-2/10 at worst (Progressing, not met)  Full active hyper extension, flexion 120 degrees or better (Progressing, not met)  Amb without deviations in community (Progressing, not met)  Pt reports 25% improvement in function or better (Progressing, not met)     Mid Term Goals: 6-8 weeks  Pt will report 0/10 pain to improve independence in ADLs (Progressing, not met)  Patient will demonstrate and maintain full, symmetric knee ROM to facilitate gait and squats. (Progressing, not met)  Anterior Y balance less than 4 cm difference to demonstrate satisfactory dynamic postural control to begin straight line jogging (Progressing, not met)  Eight inch Forward step down test pass with no deviations to demonstrate good eccentric quad control to progress to straight line jogging (Progressing, not met)  Isometric quad strength at 60 degree 90% or better LSI to demonstrate appropriate strength for straight link jogging   (Progressing, not met)  Pt reports 65% subjective improvement in function or better (Progressing, not met)     Long Term Goals: 10-12 weeks   Pt independent in d/c hep for maintenance of strength following cessation of formal therapy (Progressing, not met)  Isokinetic quad/hamstring strength 90% or better on all test to demonstrate appropriate limb symmetry for return to high level activity (Progressing, not met)  90% LSI on hop test to demonstrate appropriate limb control and power for return to high level activity (Progressing, not met)  Pt completes return to jogging program to demonstrate tolerance to repetitive joint loading for return to recreational exercise  (Progressing, not met)  Pt completes cutting, CoD program to demonstrate safety in all planes for return to recreational exercise (Progressing, not met)  Pt reports 90% or better subjective improvement in function (Progressing, not met)    PLAN     Progress quad control as able     Olivia  YESY Mari, PT, DPT, SCS

## 2024-07-08 ENCOUNTER — CLINICAL SUPPORT (OUTPATIENT)
Dept: REHABILITATION | Facility: HOSPITAL | Age: 39
End: 2024-07-08
Payer: COMMERCIAL

## 2024-07-08 DIAGNOSIS — R29.898 WEAKNESS OF LEFT LOWER EXTREMITY: ICD-10-CM

## 2024-07-08 DIAGNOSIS — R26.9 GAIT ABNORMALITY: ICD-10-CM

## 2024-07-08 DIAGNOSIS — M25.662 DECREASED RANGE OF MOTION OF LEFT KNEE: Primary | ICD-10-CM

## 2024-07-08 PROCEDURE — 97110 THERAPEUTIC EXERCISES: CPT

## 2024-07-08 PROCEDURE — 97530 THERAPEUTIC ACTIVITIES: CPT

## 2024-07-08 NOTE — PROGRESS NOTES
"[facility]   Physical Therapy Treatment Note     [facility]  Clinic Number: [id number]    Therapy Diagnosis:   Encounter Diagnoses   Name Primary?    Decreased range of motion of left knee Yes    Weakness of left lower extremity     Gait abnormality        Physician: Alonso Macias III, *    Visit Date: 7/8/2024  Physician Orders: PT Eval and Treat  Medical Diagnosis from Referral: S83.232D (ICD-10-CM) - Complex tear of medial meniscus of left knee as current injury, subsequent encounter   Evaluation Date: 6/7/2024  Authorization Period Expiration: 06/03/2024  Plan of Care Expiration: 08/30/2024  Progress Note Due: 07/05/2024  Visit # / Visits authorized: 7/ 20 (+eval)  FOTO Follow Up: #1 given at eval  FOTO Follow UP:      Time In: 0540 pm (pt late)  Time Out: 0630 pm  Total Billable Time: 50 minutes     DOS: 06/06/2024  S/p: left  1. knee arthroscopic medial meniscectomy   2. knee arthroscopic chondroplasty   3. knee arthroscopic partial synovectomy/debridement.   4. knee arthroscopic plica excision.   5. knee arthroscopic lysis of adhesions  Post-Op Precautions: s/p meniscectomy      Precautions: Standard and post-op    SUBJECTIVE     Pt reports: he continues to feel good, anxious to start running  He was compliant with home exercise program.  Response to previous treatment: improved knee mobility  Functional change: improved gait with crutches    Pain: 1/10  Location: left knee      OBJECTIVE     Objective Measures updated at progress report unless specified.     Left knee PROM: 10-0-125    4 weeks and 4 days as of 7/8    Treatment       Adria received the treatments listed below:      THERAPEUTIC EXERCISES to develop strength, endurance, ROM, flexibility, posture, and core stabilization for 25 minutes including:  Prone quad stretch; 3 x 20"   Hammer Knee Ext 7.5#; 4 x 10  DL HS bridges; 8" box; 3 x 15 x 5"       NP Today:   SL clamshell; 3 x 10 x 5"   DL bridges; " "GTB; 3 x 10 x 3"   Lateral band walks; GTB; 2 x turf     MANUAL THERAPY TECHNIQUES including Joint mobilizations and Soft tissue Mobilization were applied to L knee for 00 minutes.  Fat pad mob at 0 and 20 deg flex  Superior patellar glide Gr 2-3    NEUROMUSCULAR RE-EDUCATION ACTIVITIES to improve Balance, Coordination, Kinesthetic, Sense, Proprioception, and Posture for 00 minutes.  The following were included:   SLR 30x  LAQ; GTB 2 x 15- NP    THERAPEUTIC ACTIVITIES to improve dynamic and functional performance for 25 minutes including.  Upright Bike; Lv 10.0 for 7 min for CV endurance and tissue extensibility  RFESS; 3 x 10 B  Sneaky lunge; 3 x 10   SL Leg Press; 100#; 4 x 10    NP Today:   Box squat; GTB; 3 x 10      GAIT TRAINING to improve functional mobility and safety for 00 minutes.           Patient Education and Home Exercises     Home Exercises Provided and Patient Education Provided     Education provided:   - continue HEP, add SLR    Written Home Exercises Provided: Patient instructed to cont prior HEP. Exercises were reviewed and Adria was able to demonstrate them prior to the end of the session.  Adria demonstrated good  understanding of the education provided. See EMR under Patient Instructions for exercises provided during therapy sessions    ASSESSMENT     Adria was very fatigued by continued progression in R knee quad and hip loading. He struggled with soleus strengthening. He will benefit continuation of this in order to improve anterior tibial translation control. Will continue to progress as able.     Adria Is progressing well towards his goals.   Pt prognosis is Good.     Pt will continue to benefit from skilled outpatient physical therapy to address the deficits listed in the problem list box on initial evaluation, provide pt/family education and to maximize pt's level of independence in the home and community environment.     Pt's spiritual, cultural and educational needs considered and pt " agreeable to plan of care and goals.     Anticipated barriers to physical therapy: none    Goals:   Short Term Goals:2- 4 weeks   Pt independent in initial hep (Progressing, not met)  Pain 0-2/10 at worst (Progressing, not met)  Full active hyper extension, flexion 120 degrees or better (Progressing, not met)  Amb without deviations in community (Progressing, not met)  Pt reports 25% improvement in function or better (Progressing, not met)     Mid Term Goals: 6-8 weeks  Pt will report 0/10 pain to improve independence in ADLs (Progressing, not met)  Patient will demonstrate and maintain full, symmetric knee ROM to facilitate gait and squats. (Progressing, not met)  Anterior Y balance less than 4 cm difference to demonstrate satisfactory dynamic postural control to begin straight line jogging (Progressing, not met)  Eight inch Forward step down test pass with no deviations to demonstrate good eccentric quad control to progress to straight line jogging (Progressing, not met)  Isometric quad strength at 60 degree 90% or better LSI to demonstrate appropriate strength for straight link jogging   (Progressing, not met)  Pt reports 65% subjective improvement in function or better (Progressing, not met)     Long Term Goals: 10-12 weeks   Pt independent in d/c hep for maintenance of strength following cessation of formal therapy (Progressing, not met)  Isokinetic quad/hamstring strength 90% or better on all test to demonstrate appropriate limb symmetry for return to high level activity (Progressing, not met)  90% LSI on hop test to demonstrate appropriate limb control and power for return to high level activity (Progressing, not met)  Pt completes return to jogging program to demonstrate tolerance to repetitive joint loading for return to recreational exercise  (Progressing, not met)  Pt completes cutting, CoD program to demonstrate safety in all planes for return to recreational exercise (Progressing, not met)  Pt reports  90% or better subjective improvement in function (Progressing, not met)    PLAN     Progress quad control as able     Olivia Mari, PT, DPT, SCS

## 2024-07-16 ENCOUNTER — HOSPITAL ENCOUNTER (OUTPATIENT)
Dept: RADIOLOGY | Facility: HOSPITAL | Age: 39
Discharge: HOME | End: 2024-07-16
Attending: STUDENT IN AN ORGANIZED HEALTH CARE EDUCATION/TRAINING PROGRAM
Payer: COMMERCIAL

## 2024-07-16 DIAGNOSIS — Z82.49 FAMILY HISTORY OF CEREBRAL ANEURYSM: ICD-10-CM

## 2024-07-16 DIAGNOSIS — R25.1 TREMOR OF BOTH HANDS: ICD-10-CM

## 2024-07-16 PROCEDURE — 70544 MR ANGIOGRAPHY HEAD W/O DYE: CPT | Mod: 26,,, | Performed by: RADIOLOGY

## 2024-07-16 PROCEDURE — 70544 MR ANGIOGRAPHY HEAD W/O DYE: CPT | Mod: TC

## 2024-07-19 ENCOUNTER — CLINICAL SUPPORT (OUTPATIENT)
Dept: REHABILITATION | Facility: HOSPITAL | Age: 39
End: 2024-07-19
Payer: COMMERCIAL

## 2024-07-19 DIAGNOSIS — R29.898 WEAKNESS OF LEFT LOWER EXTREMITY: ICD-10-CM

## 2024-07-19 DIAGNOSIS — R26.9 GAIT ABNORMALITY: ICD-10-CM

## 2024-07-19 DIAGNOSIS — M25.662 DECREASED RANGE OF MOTION OF LEFT KNEE: Primary | ICD-10-CM

## 2024-07-19 PROCEDURE — 97530 THERAPEUTIC ACTIVITIES: CPT

## 2024-07-19 PROCEDURE — 97110 THERAPEUTIC EXERCISES: CPT

## 2024-07-19 PROCEDURE — 97112 NEUROMUSCULAR REEDUCATION: CPT

## 2024-07-19 NOTE — PROGRESS NOTES
"[facility]   Physical Therapy Treatment Note     [facility]  Clinic Number: [id number]    Therapy Diagnosis:   Encounter Diagnoses   Name Primary?    Decreased range of motion of left knee Yes    Weakness of left lower extremity     Gait abnormality        Physician: Alonso Macias III, *    Visit Date: 7/19/2024  Physician Orders: PT Eval and Treat  Medical Diagnosis from Referral: S83.232D (ICD-10-CM) - Complex tear of medial meniscus of left knee as current injury, subsequent encounter   Evaluation Date: 6/7/2024  Authorization Period Expiration: 06/03/2024  Plan of Care Expiration: 08/30/2024  Progress Note Due: 07/05/2024  Visit # / Visits authorized: 9/ 20 (+eval)  FOTO Follow Up: #1 given at [facility]  FOTO Follow UP:      Time In: 0715 am  Time Out: 0800 am  Total Billable Time: 45 minutes     DOS: 06/06/2024  S/p: left  1. knee arthroscopic medial meniscectomy   2. knee arthroscopic chondroplasty   3. knee arthroscopic partial synovectomy/debridement.   4. knee arthroscopic plica excision.   5. knee arthroscopic lysis of adhesions  Post-Op Precautions: s/p meniscectomy      Precautions: Standard and post-op    SUBJECTIVE     Pt reports: he has been doing well   He was compliant with home exercise program.  Response to previous treatment: improved knee mobility  Functional change: improved gait with crutches    Pain: 1/10  Location: left knee      OBJECTIVE     Objective Measures updated at progress report unless specified.     Left knee PROM: 10-0-125    6 weeks and 1 days as of 7/19    Treatment       Adria received the treatments listed below:      THERAPEUTIC EXERCISES to develop strength, endurance, ROM, flexibility, posture, and core stabilization for 15 minutes including:  Prone quad stretch; 3 x 20"   Hammer Knee Ext 10#; 3 x 10  DL HS bridges; 8" box; 3 x 15 x 5"       NP Today:   SL clamshell; 3 x 10 x 5"   DL bridges; GTB; 3 x 10 x 3"   Lateral band walks; " "GTB; 2 x turf     MANUAL THERAPY TECHNIQUES including Joint mobilizations and Soft tissue Mobilization were applied to L knee for 00 minutes.  Fat pad mob at 0 and 20 deg flex  Superior patellar glide Gr 2-3    NEUROMUSCULAR RE-EDUCATION ACTIVITIES to improve Balance, Coordination, Kinesthetic, Sense, Proprioception, and Posture for 15 minutes.  The following were included:   SLR 30x  Step down; 4" 2 x 10  Runner's Clamshell; GTB; 3 x 10 B    THERAPEUTIC ACTIVITIES to improve dynamic and functional performance for 15 minutes including.  Upright Bike; Lv 8.0 for 8 min for CV endurance and tissue extensibility  SL Shuttle Leg Press; 3 bands; 3 x 15     Kickstand SL squat; 24" box ---> add next    NP Today:   Box squat; GTB; 3 x 10  Sneaky lunge; 3 x 10   RFESS; 3 x 10 B  SL Leg Press; 100#; 4 x 10    GAIT TRAINING to improve functional mobility and safety for 00 minutes.           Patient Education and Home Exercises     Home Exercises Provided and Patient Education Provided     Education provided:   - continue HEP, add SLR    Written Home Exercises Provided: Patient instructed to cont prior HEP. Exercises were reviewed and Adria was able to demonstrate them prior to the end of the session.  Adria demonstrated good  understanding of the education provided. See EMR under Patient Instructions for exercises provided during therapy sessions    ASSESSMENT     Adria is progressing well in loading in CKC and OKC. He was very challenged by motor control aspect of step down exercise. He improved with repetition but will benefit increased practice on this intervention. Will continue to progress as able     Adria Is progressing well towards his goals.   Pt prognosis is Good.     Pt will continue to benefit from skilled outpatient physical therapy to address the deficits listed in the problem list box on initial evaluation, provide pt/family education and to maximize pt's level of independence in the home and community " environment.     Pt's spiritual, cultural and educational needs considered and pt agreeable to plan of care and goals.     Anticipated barriers to physical therapy: none    Goals:   Short Term Goals:2- 4 weeks   Pt independent in initial hep (Progressing, not met)  Pain 0-2/10 at worst (Progressing, not met)  Full active hyper extension, flexion 120 degrees or better (Progressing, not met)  Amb without deviations in community (Progressing, not met)  Pt reports 25% improvement in function or better (Progressing, not met)     Mid Term Goals: 6-8 weeks  Pt will report 0/10 pain to improve independence in ADLs (Progressing, not met)  Patient will demonstrate and maintain full, symmetric knee ROM to facilitate gait and squats. (Progressing, not met)  Anterior Y balance less than 4 cm difference to demonstrate satisfactory dynamic postural control to begin straight line jogging (Progressing, not met)  Eight inch Forward step down test pass with no deviations to demonstrate good eccentric quad control to progress to straight line jogging (Progressing, not met)  Isometric quad strength at 60 degree 90% or better LSI to demonstrate appropriate strength for straight link jogging   (Progressing, not met)  Pt reports 65% subjective improvement in function or better (Progressing, not met)     Long Term Goals: 10-12 weeks   Pt independent in d/c hep for maintenance of strength following cessation of formal therapy (Progressing, not met)  Isokinetic quad/hamstring strength 90% or better on all test to demonstrate appropriate limb symmetry for return to high level activity (Progressing, not met)  90% LSI on hop test to demonstrate appropriate limb control and power for return to high level activity (Progressing, not met)  Pt completes return to jogging program to demonstrate tolerance to repetitive joint loading for return to recreational exercise  (Progressing, not met)  Pt completes cutting, CoD program to demonstrate safety in  all planes for return to recreational exercise (Progressing, not met)  Pt reports 90% or better subjective improvement in function (Progressing, not met)    PLAN     Progress quad control as able     Olivia Mari, PT, DPT, SCS

## 2024-07-22 ENCOUNTER — OFFICE VISIT (OUTPATIENT)
Dept: SPORTS MEDICINE | Facility: CLINIC | Age: 39
End: 2024-07-22
Payer: COMMERCIAL

## 2024-07-22 VITALS
SYSTOLIC BLOOD PRESSURE: 114 MMHG | DIASTOLIC BLOOD PRESSURE: 80 MMHG | WEIGHT: 179.38 LBS | BODY MASS INDEX: 26.48 KG/M2 | HEART RATE: 67 BPM

## 2024-07-22 DIAGNOSIS — Z98.890 S/P ARTHROSCOPY OF LEFT KNEE: Primary | ICD-10-CM

## 2024-07-22 PROCEDURE — 3074F SYST BP LT 130 MM HG: CPT | Mod: CPTII,S$GLB,, | Performed by: ORTHOPAEDIC SURGERY

## 2024-07-22 PROCEDURE — 1159F MED LIST DOCD IN RCRD: CPT | Mod: CPTII,S$GLB,, | Performed by: ORTHOPAEDIC SURGERY

## 2024-07-22 PROCEDURE — 99024 POSTOP FOLLOW-UP VISIT: CPT | Mod: S$GLB,,, | Performed by: ORTHOPAEDIC SURGERY

## 2024-07-22 PROCEDURE — 3079F DIAST BP 80-89 MM HG: CPT | Mod: CPTII,S$GLB,, | Performed by: ORTHOPAEDIC SURGERY

## 2024-07-22 PROCEDURE — 3044F HG A1C LEVEL LT 7.0%: CPT | Mod: CPTII,S$GLB,, | Performed by: ORTHOPAEDIC SURGERY

## 2024-07-22 PROCEDURE — 99999 PR PBB SHADOW E&M-EST. PATIENT-LVL III: CPT | Mod: PBBFAC,,, | Performed by: ORTHOPAEDIC SURGERY

## 2024-07-22 NOTE — PROGRESS NOTES
CC: left knee pain    History of present illness: Pt is here today for post-operative followup of knee arthroscopy.  he is doing well.  We have reviewed his findings and discussed plan of care and future treatment options.       He is doing well.   Pain at 0/10.   Not taking any pain medications. Doing PT.       DATE OF PROCEDURE: 06/10/2024  SURGEON:  Ivelisse Cano M.D  PROCEDURE PERFORMED:   left  1. knee arthroscopic medial meniscectomy   2. knee arthroscopic chondroplasty   3. knee arthroscopic partial synovectomy/debridement.   4. knee arthroscopic plica excision.   5. knee arthroscopic lysis of adhesions    In the patellofemoral compartment, there was chondral damage to:  Patella 20 x 20 mm grade 2  Chondroplasty was performed using arthroscopic shaver.                                                                               PHYSICAL EXAMINATION:     Incision sites healed well  No evidence of any erythema, infection or induration  Range of motion 0-130 degrees  Minimal effusion  2+ DP pulse  No swelling, no calf tenderness  - Blanche's sign  Negative medial joint line tenderness  Moderate quad atrophy                                                                                 ASSESSMENT:                                                                                                                                               1. Status post above, doing well.                                                                                                                               PLAN:                                                                                                                                                     1. Continue with PT.   2. Emphasized quad function.  3. I have discussed return to activity in detail.  4.Patient will see us back PRN                                   5. Discussed case with PT.   All questions were answered, surgical technique was reviewed and  interpreted, and patient should contact us with any questions or concerns in the interim.

## 2024-07-25 ENCOUNTER — CLINICAL SUPPORT (OUTPATIENT)
Dept: REHABILITATION | Facility: HOSPITAL | Age: 39
End: 2024-07-25
Payer: COMMERCIAL

## 2024-07-25 DIAGNOSIS — R29.898 WEAKNESS OF LEFT LOWER EXTREMITY: ICD-10-CM

## 2024-07-25 DIAGNOSIS — M25.662 DECREASED RANGE OF MOTION OF LEFT KNEE: Primary | ICD-10-CM

## 2024-07-25 DIAGNOSIS — R26.9 GAIT ABNORMALITY: ICD-10-CM

## 2024-07-25 PROCEDURE — 97530 THERAPEUTIC ACTIVITIES: CPT

## 2024-07-25 PROCEDURE — 97112 NEUROMUSCULAR REEDUCATION: CPT

## 2024-07-25 PROCEDURE — 97110 THERAPEUTIC EXERCISES: CPT

## 2024-07-25 NOTE — PROGRESS NOTES
"[facility]   Physical Therapy Treatment Note     [facility]  Clinic Number: [id number]    Therapy Diagnosis:   Encounter Diagnoses   Name Primary?    Decreased range of motion of left knee Yes    Weakness of left lower extremity     Gait abnormality        Physician: Alonso Macias III, *    Visit Date: 7/25/2024  Physician Orders: PT Eval and Treat  Medical Diagnosis from Referral: S83.232D (ICD-10-CM) - Complex tear of medial meniscus of left knee as current injury, subsequent encounter   Evaluation Date: 6/7/2024  Authorization Period Expiration: 06/03/2024  Plan of Care Expiration: 08/30/2024  Progress Note Due: 07/05/2024  Visit # / Visits authorized: 10/ 20 (+eval)  FOTO Follow Up: #1 given at [facility]  FOTO Follow UP:      Time In: 0606 pm  Time Out: 0700 pm  Total Billable Time: 54 minutes     DOS: 06/06/2024  S/p: left  1. knee arthroscopic medial meniscectomy   2. knee arthroscopic chondroplasty   3. knee arthroscopic partial synovectomy/debridement.   4. knee arthroscopic plica excision.   5. knee arthroscopic lysis of adhesions  Post-Op Precautions: s/p meniscectomy      Precautions: Standard and post-op    SUBJECTIVE     Pt reports: feels good, had good follow up with MD  He was compliant with home exercise program.  Response to previous treatment: improved knee mobility  Functional change: improved gait with crutches    Pain: 1/10  Location: left knee      OBJECTIVE     Objective Measures updated at progress report unless specified.     Left knee PROM: 10-0-125    6 weeks and 1 days as of 7/19    Treatment       Adria received the treatments listed below:      THERAPEUTIC EXERCISES to develop strength, endurance, ROM, flexibility, posture, and core stabilization for 17 minutes including:  Elliptical Lv 4.0 for 8 min for CV endurance and reciprocal movement  Prone quad stretch; 3 x 20"   Hammer Knee Ext 10#; 4 x 10  DL HS bridges; 12" box; 3 x 10 x 5"   Lateral " "band walks; GTB; 2 x turf         MANUAL THERAPY TECHNIQUES including Joint mobilizations and Soft tissue Mobilization were applied to L knee for 00 minutes.  Fat pad mob at 0 and 20 deg flex  Superior patellar glide Gr 2-3    NEUROMUSCULAR RE-EDUCATION ACTIVITIES to improve Balance, Coordination, Kinesthetic, Sense, Proprioception, and Posture for 12 minutes.  The following were included:   SLR 30x- NP  Step down; 4" 2 x 10  Runner's Clamshell; GTB; 3 x 10 B    THERAPEUTIC ACTIVITIES to improve dynamic and functional performance for 25 minutes including.  Fan Bike 35 rpm for 8 min   SL Shuttle Leg Press; 3 bands; 3 x 15   Kickstand SL squat; 20" box; 2 x 10  SL squat; 24" box; 1 x 10    NP Today:   Box squat; GTB; 3 x 10   RFESS; 3 x 10 B    GAIT TRAINING to improve functional mobility and safety for 00 minutes.           Patient Education and Home Exercises     Home Exercises Provided and Patient Education Provided     Education provided:   - continue HEP, add SLR    Written Home Exercises Provided: Patient instructed to cont prior HEP. Exercises were reviewed and Adria was able to demonstrate them prior to the end of the session.  Adria demonstrated good  understanding of the education provided. See EMR under Patient Instructions for exercises provided during therapy sessions    ASSESSMENT     Adria is progressing well and will benefit continued SL motor control. He tolerated SL box squats well but required cues to avoid femoral adduction and internal rotation. He will benefit continued progression as able.     Adria Is progressing well towards his goals.   Pt prognosis is Good.     Pt will continue to benefit from skilled outpatient physical therapy to address the deficits listed in the problem list box on initial evaluation, provide pt/family education and to maximize pt's level of independence in the home and community environment.     Pt's spiritual, cultural and educational needs considered and pt agreeable " to plan of care and goals.     Anticipated barriers to physical therapy: none    Goals:   Short Term Goals:2- 4 weeks   Pt independent in initial hep (Progressing, not met)  Pain 0-2/10 at worst (Progressing, not met)  Full active hyper extension, flexion 120 degrees or better (Progressing, not met)  Amb without deviations in community (Progressing, not met)  Pt reports 25% improvement in function or better (Progressing, not met)     Mid Term Goals: 6-8 weeks  Pt will report 0/10 pain to improve independence in ADLs (Progressing, not met)  Patient will demonstrate and maintain full, symmetric knee ROM to facilitate gait and squats. (Progressing, not met)  Anterior Y balance less than 4 cm difference to demonstrate satisfactory dynamic postural control to begin straight line jogging (Progressing, not met)  Eight inch Forward step down test pass with no deviations to demonstrate good eccentric quad control to progress to straight line jogging (Progressing, not met)  Isometric quad strength at 60 degree 90% or better LSI to demonstrate appropriate strength for straight link jogging   (Progressing, not met)  Pt reports 65% subjective improvement in function or better (Progressing, not met)     Long Term Goals: 10-12 weeks   Pt independent in d/c hep for maintenance of strength following cessation of formal therapy (Progressing, not met)  Isokinetic quad/hamstring strength 90% or better on all test to demonstrate appropriate limb symmetry for return to high level activity (Progressing, not met)  90% LSI on hop test to demonstrate appropriate limb control and power for return to high level activity (Progressing, not met)  Pt completes return to jogging program to demonstrate tolerance to repetitive joint loading for return to recreational exercise  (Progressing, not met)  Pt completes cutting, CoD program to demonstrate safety in all planes for return to recreational exercise (Progressing, not met)  Pt reports 90% or  better subjective improvement in function (Progressing, not met)    PLAN     Progress quad control as able     Olivia Mari, PT, DPT, SCS

## 2024-07-29 ENCOUNTER — CLINICAL SUPPORT (OUTPATIENT)
Dept: REHABILITATION | Facility: HOSPITAL | Age: 39
End: 2024-07-29
Payer: COMMERCIAL

## 2024-07-29 DIAGNOSIS — M25.662 DECREASED RANGE OF MOTION OF LEFT KNEE: Primary | ICD-10-CM

## 2024-07-29 DIAGNOSIS — R29.898 WEAKNESS OF LEFT LOWER EXTREMITY: ICD-10-CM

## 2024-07-29 DIAGNOSIS — R26.9 GAIT ABNORMALITY: ICD-10-CM

## 2024-07-29 PROCEDURE — 97110 THERAPEUTIC EXERCISES: CPT

## 2024-07-29 PROCEDURE — 97530 THERAPEUTIC ACTIVITIES: CPT

## 2024-07-29 NOTE — PROGRESS NOTES
[facility]   Physical Therapy Treatment Note     Name: Jose Covington [facility]  Clinic Number: [id number]    Therapy Diagnosis:   Encounter Diagnoses   Name Primary?    Decreased range of motion of left knee Yes    Weakness of left lower extremity     Gait abnormality        Physician: Alonso Macias III, *    Visit Date: 2024  Physician Orders: PT Eval and Treat  Medical Diagnosis from Referral: S83.232D (ICD-10-CM) - Complex tear of medial meniscus of left knee as current injury, subsequent encounter   Evaluation Date: 2024  Authorization Period Expiration: 2024  Plan of Care Expiration: 2024  Progress Note Due: 2024  Visit # / Visits authorized:  (+eval)  FOTO Follow Up: #1 given at [facility]  FOTO Follow UP:      Time In: 0540 pm  Time Out: 0630 pm  Total Billable Time: 50 minutes     DOS: 2024  S/p: left  1. knee arthroscopic medial meniscectomy   2. knee arthroscopic chondroplasty   3. knee arthroscopic partial synovectomy/debridement.   4. knee arthroscopic plica excision.   5. knee arthroscopic lysis of adhesions  Post-Op Precautions: s/p meniscectomy      Precautions: Standard and post-op    SUBJECTIVE     Pt reports: no new complaints  He was compliant with home exercise program.  Response to previous treatment: improved knee mobility  Functional change: improved gait with crutches    Pain: 1/10  Location: left knee      OBJECTIVE     Objective Measures updated at progress report unless specified.     Left knee PROM: 10-0-125    7 weeks and 4 days as of     Tindeq isometric dynamometer Right Left Affected Limb % deficit   Knee Extension @ 60 deg  34.3 kg  42.1 kg  45 kg  Av.5 kg 34.6 kg  40.9 kg  43.5 kg  Av.7 kg  L 2.0%   Knee Flexion @ 60 deg 235 kg  254 kg  253 kg  Av.7 kg 23.4 kg  23.5 kg  24.6 kg  Av.8 kg L 4.0%         Treatment       Adria received the treatments listed below:      THERAPEUTIC EXERCISES to develop  "strength, endurance, ROM, flexibility, posture, and core stabilization for 25 minutes including:  Elliptical Lv 4.0 for 8 min for CV endurance and reciprocal movement  Prone quad stretch; 3 x 20"   Updated test/measures    NP Today:   Hammer Knee Ext 10#; 4 x 10  DL HS bridges; 12" box; 3 x 10 x 5"   Lateral band walks; GTB; 2 x turf         MANUAL THERAPY TECHNIQUES including Joint mobilizations and Soft tissue Mobilization were applied to L knee for 00 minutes.  Fat pad mob at 0 and 20 deg flex  Superior patellar glide Gr 2-3    NEUROMUSCULAR RE-EDUCATION ACTIVITIES to improve Balance, Coordination, Kinesthetic, Sense, Proprioception, and Posture for 00 minutes.  The following were included:   SLR 30x- NP  Step down; 4" 2 x 10  Runner's Clamshell; GTB; 3 x 10 B    THERAPEUTIC ACTIVITIES to improve dynamic and functional performance for 25 minutes including.  SL Shuttle Leg Press; 3 bands; 3 x 15   Kickstand SL squat; 20" box; 2 x 10  SL squat; 24" box; 1 x 10  Y balance reach x 5 min     NP Today:   Box squat; GTB; 3 x 10   RFESS; 3 x 10 B  Fan Bike 35 rpm for 8 min     GAIT TRAINING to improve functional mobility and safety for 00 minutes.           Patient Education and Home Exercises     Home Exercises Provided and Patient Education Provided     Education provided:   - continue HEP, add SLR    Written Home Exercises Provided: Patient instructed to cont prior HEP. Exercises were reviewed and Adria was able to demonstrate them prior to the end of the session.  Adria demonstrated good  understanding of the education provided. See EMR under Patient Instructions for exercises provided during therapy sessions    ASSESSMENT     Adria demonstrated excellent limb symmetry in quad and hamstring isometric testing. He is also improving in SL motor control with some cues needed to avoid femoral adduction and internal rotation. He will benefit continued SL control with Y balance assessment next session to determine readiness " for return to jog program.     Adria Is progressing well towards his goals.   Pt prognosis is Good.     Pt will continue to benefit from skilled outpatient physical therapy to address the deficits listed in the problem list box on initial evaluation, provide pt/family education and to maximize pt's level of independence in the home and community environment.     Pt's spiritual, cultural and educational needs considered and pt agreeable to plan of care and goals.     Anticipated barriers to physical therapy: none    Goals:   Short Term Goals:2- 4 weeks   Pt independent in initial hep MET  Pain 0-2/10 at worst MET  Full active hyper extension, flexion 120 degrees or better MET  Amb without deviations in community MET  Pt reports 25% improvement in function or better MET     Mid Term Goals: 6-8 weeks  Pt will report 0/10 pain to improve independence in ADLs MET  Patient will demonstrate and maintain full, symmetric knee ROM to facilitate gait and squats. MET  Anterior Y balance less than 4 cm difference to demonstrate satisfactory dynamic postural control to begin straight line jogging (Progressing, not met)  Eight inch Forward step down test pass with no deviations to demonstrate good eccentric quad control to progress to straight line jogging (Progressing, not met)  Isometric quad strength at 60 degree 90% or better LSI to demonstrate appropriate strength for straight link jogging  MET  Pt reports 65% subjective improvement in function or better (Progressing, not met)     Long Term Goals: 10-12 weeks   Pt independent in d/c hep for maintenance of strength following cessation of formal therapy (Progressing, not met)  Isokinetic quad/hamstring strength 90% or better on all test to demonstrate appropriate limb symmetry for return to high level activity (Progressing, not met)  90% LSI on hop test to demonstrate appropriate limb control and power for return to high level activity (Progressing, not met)  Pt completes  return to jogging program to demonstrate tolerance to repetitive joint loading for return to recreational exercise  (Progressing, not met)  Pt completes cutting, CoD program to demonstrate safety in all planes for return to recreational exercise (Progressing, not met)  Pt reports 90% or better subjective improvement in function (Progressing, not met)    PLAN     Progress quad control as able     Olivia Mari, PT, DPT, SCS

## 2024-08-19 ENCOUNTER — CLINICAL SUPPORT (OUTPATIENT)
Dept: REHABILITATION | Facility: HOSPITAL | Age: 39
End: 2024-08-19
Payer: COMMERCIAL

## 2024-08-19 DIAGNOSIS — M25.662 DECREASED RANGE OF MOTION OF LEFT KNEE: Primary | ICD-10-CM

## 2024-08-19 DIAGNOSIS — R26.9 GAIT ABNORMALITY: ICD-10-CM

## 2024-08-19 DIAGNOSIS — R29.898 WEAKNESS OF LEFT LOWER EXTREMITY: ICD-10-CM

## 2024-08-19 PROCEDURE — 97530 THERAPEUTIC ACTIVITIES: CPT

## 2024-08-19 PROCEDURE — 97110 THERAPEUTIC EXERCISES: CPT

## 2024-08-19 NOTE — PROGRESS NOTES
[facility]   Physical Therapy Treatment Note     Name: Jose Frost  Clinic Number: [id number]    Therapy Diagnosis:   Encounter Diagnoses   Name Primary?    Decreased range of motion of left knee Yes    Weakness of left lower extremity     Gait abnormality        Physician: Alonso Macias III, *    Visit Date: 2024  Physician Orders: PT Eval and Treat  Medical Diagnosis from Referral: S83.232D (ICD-10-CM) - Complex tear of medial meniscus of left knee as current injury, subsequent encounter   Evaluation Date: 2024  Authorization Period Expiration: 2024  Plan of Care Expiration: 2024  Progress Note Due: 2024  Visit # / Visits authorized:  (+eval)  FOTO Follow Up: #1 given at eval  FOTO Follow UP: #2 given at visit 12     Time In: 0235 pm  Time Out: 0335 pm  Total Billable Time: 60 minutes     DOS: 2024  S/p: left  1. knee arthroscopic medial meniscectomy   2. knee arthroscopic chondroplasty   3. knee arthroscopic partial synovectomy/debridement.   4. knee arthroscopic plica excision.   5. knee arthroscopic lysis of adhesions  Post-Op Precautions: s/p meniscectomy      Precautions: Standard and post-op    SUBJECTIVE     Pt reports: has been working out in the gym and riding the elliptical   He was compliant with home exercise program.  Response to previous treatment: improved knee mobility  Functional change: improved gait with crutches    Pain: 1/10  Location: left knee      OBJECTIVE     Objective Measures updated at progress report unless specified.     Left knee PROM: 10-0-125    10 weeks and 4 days as of     Tindeq isometric dynamometer Right Left Affected Limb % deficit   Knee Extension @ 60 deg  34.3 kg  42.1 kg  45 kg  Av.5 kg 34.6 kg  40.9 kg  43.5 kg  Av.7 kg  L 2.0%   Knee Flexion @ 60 deg 235 kg  254 kg  253 kg  Av.7 kg 23.4 kg  23.5 kg  24.6 kg  Av.8 kg L 4.0%     Y-Balance  Right  Left   Anterior        "Trial 1: 53 cm  Trial 2: 53 cm  Trial 3: 59 cm    Greatest reach: 59 cm   Trial 1: 59 cm  Trial 2: 58 cm  Trial 3: 59 cm    Greatest reach: 59 cm     6" Step Down Assessment: quad dominant, slight valgus, able to correct with cues    Treatment       Adria received the treatments listed below:      THERAPEUTIC EXERCISES to develop strength, endurance, ROM, flexibility, posture, and core stabilization for 20 minutes including:  Upright bike Lv 10.0 for 10 min for CV endurance and reciprocal movement  Updated test/measures    NP Today:   Hammer Knee Ext 10#; 4 x 10  DL HS bridges; 12" box; 3 x 10 x 5"   Lateral band walks; GTB; 2 x turf   Prone quad stretch; 3 x 20"       MANUAL THERAPY TECHNIQUES including Joint mobilizations and Soft tissue Mobilization were applied to L knee for 00 minutes.  Fat pad mob at 0 and 20 deg flex  Superior patellar glide Gr 2-3    NEUROMUSCULAR RE-EDUCATION ACTIVITIES to improve Balance, Coordination, Kinesthetic, Sense, Proprioception, and Posture for 00 minutes.  The following were included:   SLR 30x  Runner's Clamshell; GTB; 3 x 10 B    THERAPEUTIC ACTIVITIES to improve dynamic and functional performance for 40 minutes including.  Dynamic functional mobility work  <-> jog/back pedal 3 x  <-> knee pull/quad pull  <-> hip flexor/hamstring stretch  3 x up/down dog/ heel stretch/ inch worm\  Step down; 6" 30x   Return to jog plyos  DL hops in place 3 x 30  DL line hops (forward/retro, side to side) 3 x 30  SL hops in place 3 x 20 ea side  SL line hops (forward/retro, side to side) 3 x 20 ea side      NP Today:   Box squat; GTB; 3 x 10   RFESS; 3 x 10 B  Fan Bike 35 rpm for 8 min   SL Shuttle Leg Press; 3 bands; 3 x 15   Kickstand SL squat; 20" box; 2 x 10  SL squat; 24" box; 1 x 10  Y balance reach x 5 min     GAIT TRAINING to improve functional mobility and safety for 00 minutes.           Patient Education and Home Exercises     Home Exercises Provided and Patient Education Provided "     Education provided:   - continue HEP, add SLR    Written Home Exercises Provided: Patient instructed to cont prior HEP. Exercises were reviewed and Adria was able to demonstrate them prior to the end of the session.  Adria demonstrated good  understanding of the education provided. See EMR under Patient Instructions for exercises provided during therapy sessions    ASSESSMENT     Adria is progressing well in return to run criteria with good Y balance anterior reach and no pain with step down. Some balance limitations in step down that will improve with practice. He was fatigued by return to jog plyos but reported no knee pain. Gave return to run program and instructed to continue to practice hopping. If no adverse effects, will begin jogging progression next session .     Adria Is progressing well towards his goals.   Pt prognosis is Good.     Pt will continue to benefit from skilled outpatient physical therapy to address the deficits listed in the problem list box on initial evaluation, provide pt/family education and to maximize pt's level of independence in the home and community environment.     Pt's spiritual, cultural and educational needs considered and pt agreeable to plan of care and goals.     Anticipated barriers to physical therapy: none    Goals:   Short Term Goals:2- 4 weeks   Pt independent in initial hep MET  Pain 0-2/10 at worst MET  Full active hyper extension, flexion 120 degrees or better MET  Amb without deviations in community MET  Pt reports 25% improvement in function or better MET     Mid Term Goals: 6-8 weeks  Pt will report 0/10 pain to improve independence in ADLs MET  Patient will demonstrate and maintain full, symmetric knee ROM to facilitate gait and squats. MET  Anterior Y balance less than 4 cm difference to demonstrate satisfactory dynamic postural control to begin straight line jogging (Progressing, not met)  Eight inch Forward step down test pass with no deviations to  demonstrate good eccentric quad control to progress to straight line jogging (Progressing, not met)  Isometric quad strength at 60 degree 90% or better LSI to demonstrate appropriate strength for straight link jogging  MET  Pt reports 65% subjective improvement in function or better (Progressing, not met)     Long Term Goals: 10-12 weeks   Pt independent in d/c hep for maintenance of strength following cessation of formal therapy (Progressing, not met)  Isokinetic quad/hamstring strength 90% or better on all test to demonstrate appropriate limb symmetry for return to high level activity (Progressing, not met)  90% LSI on hop test to demonstrate appropriate limb control and power for return to high level activity (Progressing, not met)  Pt completes return to jogging program to demonstrate tolerance to repetitive joint loading for return to recreational exercise  (Progressing, not met)  Pt completes cutting, CoD program to demonstrate safety in all planes for return to recreational exercise (Progressing, not met)  Pt reports 90% or better subjective improvement in function (Progressing, not met)    PLAN     Progress quad control as able     Olivia Mari, PT, DPT, SCS

## 2024-08-26 ENCOUNTER — CLINICAL SUPPORT (OUTPATIENT)
Dept: REHABILITATION | Facility: HOSPITAL | Age: 39
End: 2024-08-26
Payer: COMMERCIAL

## 2024-08-26 DIAGNOSIS — M25.662 DECREASED RANGE OF MOTION OF LEFT KNEE: Primary | ICD-10-CM

## 2024-08-26 DIAGNOSIS — R29.898 WEAKNESS OF LEFT LOWER EXTREMITY: ICD-10-CM

## 2024-08-26 DIAGNOSIS — R26.9 GAIT ABNORMALITY: ICD-10-CM

## 2024-08-26 PROCEDURE — 97112 NEUROMUSCULAR REEDUCATION: CPT

## 2024-08-26 PROCEDURE — 97530 THERAPEUTIC ACTIVITIES: CPT

## 2024-08-26 NOTE — PROGRESS NOTES
/[facility]   Physical Therapy Treatment Note     Name: Jose Frost  Clinic Number: [id number]    Therapy Diagnosis:   Encounter Diagnoses   Name Primary?    Decreased range of motion of left knee Yes    Weakness of left lower extremity     Gait abnormality      Physician: Alonso Macias III, *    Visit Date: 2024  Physician Orders: PT Eval and Treat  Medical Diagnosis from Referral: S83.232D (ICD-10-CM) - Complex tear of medial meniscus of left knee as current injury, subsequent encounter   Evaluation Date: 2024  Authorization Period Expiration: 2024  Plan of Care Expiration: 2024  Progress Note Due: 2024  Visit # / Visits authorized:  (+eval)  FOTO Follow Up: #1 given at eval  FOTO Follow UP: #2 given at visit 12     Time In: 0540 pm  Time Out: 0640 pm  Total Billable Time: 60 minutes     DOS: 2024  S/p: left  1. knee arthroscopic medial meniscectomy   2. knee arthroscopic chondroplasty   3. knee arthroscopic partial synovectomy/debridement.   4. knee arthroscopic plica excision.   5. knee arthroscopic lysis of adhesions  Post-Op Precautions: s/p meniscectomy      Precautions: Standard and post-op    SUBJECTIVE     Pt reports: his knee feels good and the hopping program has gone well   He was compliant with home exercise program.  Response to previous treatment: improved knee mobility  Functional change: improved gait with crutches    Pain: 1/10  Location: left knee      OBJECTIVE     Objective Measures updated at progress report unless specified.     Left knee PROM: 10-0-125    10 weeks and 4 days as of     Tindeq isometric dynamometer Right Left Affected Limb % deficit   Knee Extension @ 60 deg  34.3 kg  42.1 kg  45 kg  Av.5 kg 34.6 kg  40.9 kg  43.5 kg  Av.7 kg  L 2.0%   Knee Flexion @ 60 deg 235 kg  254 kg  253 kg  Av.7 kg 23.4 kg  23.5 kg  24.6 kg  Av.8 kg L 4.0%     Y-Balance  Right  Left   Anterior        "Trial 1: 53 cm  Trial 2: 53 cm  Trial 3: 59 cm    Greatest reach: 59 cm   Trial 1: 59 cm  Trial 2: 58 cm  Trial 3: 59 cm    Greatest reach: 59 cm     6" Step Down Assessment: quad dominant, slight valgus, able to correct with cues    Treatment       Adria received the treatments listed below:      THERAPEUTIC EXERCISES to develop strength, endurance, ROM, flexibility, posture, and core stabilization for 00 minutes including:      NP Today:   Hammer Knee Ext 10#; 4 x 10  DL HS bridges; 12" box; 3 x 10 x 5"   Lateral band walks; GTB; 2 x turf   Prone quad stretch; 3 x 20"   Upright bike Lv 10.0 for 10 min for CV endurance and reciprocal movement  Updated test/measures      MANUAL THERAPY TECHNIQUES including Joint mobilizations and Soft tissue Mobilization were applied to L knee for 00 minutes.  Fat pad mob at 0 and 20 deg flex  Superior patellar glide Gr 2-3    NEUROMUSCULAR RE-EDUCATION ACTIVITIES to improve Balance, Coordination, Kinesthetic, Sense, Proprioception, and Posture for 20 minutes.  The following were included:   SLR 30x  Runner's Clamshell; GTB; 3 x 10 B  DL bridge; BTB; 3 x 15 x 5"     THERAPEUTIC ACTIVITIES to improve dynamic and functional performance for 40 minutes including.  Dynamic functional mobility work  <-> jog/back pedal 3 x  <-> knee pull/quad pull  <-> hip flexor/hamstring stretch  3 x up/down dog/ heel stretch/ inch worm  Alter G 80% BW   5 rounds   - 3 min walk   - 2 min jog      NP Today:   Box squat; GTB; 3 x 10   RFESS; 3 x 10 B  Fan Bike 35 rpm for 8 min   SL Shuttle Leg Press; 3 bands; 3 x 15   Kickstand SL squat; 20" box; 2 x 10  SL squat; 24" box; 1 x 10  Y balance reach x 5 min     GAIT TRAINING to improve functional mobility and safety for 00 minutes.           Patient Education and Home Exercises     Home Exercises Provided and Patient Education Provided     Education provided:   - continue HEP, add SLR    Written Home Exercises Provided: Patient instructed to cont prior HEP. " Exercises were reviewed and Adria was able to demonstrate them prior to the end of the session.  Adria demonstrated good  understanding of the education provided. See EMR under Patient Instructions for exercises provided during therapy sessions    ASSESSMENT     Adria tolerate partial BW jog/walk progression well today with no adverse effects. He was instructed to monitor response and if no swelling/pain/or discomfort to attempt phase 1 of walk/jog progression on return to jog instructions. Will monitor response and progress as able.     *PT one on one with patient for majority of therapy session with PT extender utilized for remainder of session*       Adria Is progressing well towards his goals.   Pt prognosis is Good.     Pt will continue to benefit from skilled outpatient physical therapy to address the deficits listed in the problem list box on initial evaluation, provide pt/family education and to maximize pt's level of independence in the home and community environment.     Pt's spiritual, cultural and educational needs considered and pt agreeable to plan of care and goals.     Anticipated barriers to physical therapy: none    Goals:   Short Term Goals:2- 4 weeks   Pt independent in initial hep MET  Pain 0-2/10 at worst MET  Full active hyper extension, flexion 120 degrees or better MET  Amb without deviations in community MET  Pt reports 25% improvement in function or better MET     Mid Term Goals: 6-8 weeks  Pt will report 0/10 pain to improve independence in ADLs MET  Patient will demonstrate and maintain full, symmetric knee ROM to facilitate gait and squats. MET  Anterior Y balance less than 4 cm difference to demonstrate satisfactory dynamic postural control to begin straight line jogging (Progressing, not met)  Eight inch Forward step down test pass with no deviations to demonstrate good eccentric quad control to progress to straight line jogging (Progressing, not met)  Isometric quad strength at 60  degree 90% or better LSI to demonstrate appropriate strength for straight link jogging  MET  Pt reports 65% subjective improvement in function or better (Progressing, not met)     Long Term Goals: 10-12 weeks   Pt independent in d/c hep for maintenance of strength following cessation of formal therapy (Progressing, not met)  Isokinetic quad/hamstring strength 90% or better on all test to demonstrate appropriate limb symmetry for return to high level activity (Progressing, not met)  90% LSI on hop test to demonstrate appropriate limb control and power for return to high level activity (Progressing, not met)  Pt completes return to jogging program to demonstrate tolerance to repetitive joint loading for return to recreational exercise  (Progressing, not met)  Pt completes cutting, CoD program to demonstrate safety in all planes for return to recreational exercise (Progressing, not met)  Pt reports 90% or better subjective improvement in function (Progressing, not met)    PLAN     Progress quad control as able     Olivia Mari, PT, DPT, SCS

## 2024-09-19 ENCOUNTER — PATIENT MESSAGE (OUTPATIENT)
Dept: PRIMARY CARE CLINIC | Facility: CLINIC | Age: 39
End: 2024-09-19
Payer: COMMERCIAL

## 2024-09-25 ENCOUNTER — OFFICE VISIT (OUTPATIENT)
Dept: SLEEP MEDICINE | Facility: CLINIC | Age: 39
End: 2024-09-25
Payer: COMMERCIAL

## 2024-09-25 VITALS
WEIGHT: 185.19 LBS | DIASTOLIC BLOOD PRESSURE: 75 MMHG | HEIGHT: 69 IN | HEART RATE: 53 BPM | SYSTOLIC BLOOD PRESSURE: 119 MMHG | BODY MASS INDEX: 27.43 KG/M2

## 2024-09-25 DIAGNOSIS — R06.83 SNORING: ICD-10-CM

## 2024-09-25 DIAGNOSIS — G47.19 OTHER HYPERSOMNIA: Primary | ICD-10-CM

## 2024-09-25 PROCEDURE — 99999 PR PBB SHADOW E&M-EST. PATIENT-LVL III: CPT | Mod: PBBFAC,,, | Performed by: NURSE PRACTITIONER

## 2024-09-25 PROCEDURE — 1160F RVW MEDS BY RX/DR IN RCRD: CPT | Mod: CPTII,S$GLB,, | Performed by: NURSE PRACTITIONER

## 2024-09-25 PROCEDURE — 1159F MED LIST DOCD IN RCRD: CPT | Mod: CPTII,S$GLB,, | Performed by: NURSE PRACTITIONER

## 2024-09-25 PROCEDURE — 3078F DIAST BP <80 MM HG: CPT | Mod: CPTII,S$GLB,, | Performed by: NURSE PRACTITIONER

## 2024-09-25 PROCEDURE — 99204 OFFICE O/P NEW MOD 45 MIN: CPT | Mod: S$GLB,,, | Performed by: NURSE PRACTITIONER

## 2024-09-25 PROCEDURE — 3008F BODY MASS INDEX DOCD: CPT | Mod: CPTII,S$GLB,, | Performed by: NURSE PRACTITIONER

## 2024-09-25 PROCEDURE — 3074F SYST BP LT 130 MM HG: CPT | Mod: CPTII,S$GLB,, | Performed by: NURSE PRACTITIONER

## 2024-09-25 PROCEDURE — 3044F HG A1C LEVEL LT 7.0%: CPT | Mod: CPTII,S$GLB,, | Performed by: NURSE PRACTITIONER

## 2024-09-25 NOTE — PROGRESS NOTES
"Referred by Crystal Moss MD     NEW PATIENT VISIT    Jose Frost  is a pleasant 39 y.o. male who presents in the Fall of 2024 for sleep evaluation.    See assessment below for further history.    Past Medical History:   Diagnosis Date    Mild intermittent asthma, uncomplicated      Patient Active Problem List   Diagnosis    Complex tear of medial meniscus of left knee as current injury    Decreased range of motion of left knee    Weakness of left lower extremity    Gait abnormality     Current Outpatient Medications:     multivitamin (THERAGRAN) per tablet, Take 1 tablet by mouth once daily., Disp: , Rfl:     acetaminophen (TYLENOL) 500 MG tablet, Take 500 mg by mouth every 6 (six) hours as needed for Pain. (Patient not taking: Reported on 9/25/2024), Disp: , Rfl:     aspirin (ECOTRIN) 81 MG EC tablet, Take 1 tablet (81 mg total) by mouth once daily. For 4 weeks starting the day after surgery. (Patient not taking: Reported on 9/25/2024), Disp: 28 tablet, Rfl: 0    HYDROcodone-acetaminophen (NORCO)  mg per tablet, Take 1 tablet by mouth every 4-6 hours for pain. (Patient not taking: Reported on 6/21/2024), Disp: 12 tablet, Rfl: 0    promethazine (PHENERGAN) 25 MG tablet, Take 1 tablet (25 mg total) by mouth every 6 (six) hours as needed for Nausea. (Patient not taking: Reported on 9/25/2024), Disp: 8 tablet, Rfl: 0    traMADoL (ULTRAM) 50 mg tablet, Take 1-2 tablets ( mg total) by mouth every 6 (six) hours as needed for Pain. (Patient not taking: Reported on 6/21/2024), Disp: 21 tablet, Rfl: 0      Vitals:    09/25/24 1311   BP: 119/75   BP Location: Right arm   Patient Position: Sitting   BP Method: Small (Automatic)   Pulse: (!) 53   Weight: 84 kg (185 lb 3 oz)   Height: 5' 9" (1.753 m)     Physical Exam:    GEN:   Well-appearing  Psych:  Appropriate affect, demonstrates insight  SKIN:  No rash on the face or bridge of the nose      LABS:   Lab Results   Component Value Date    CO2 25 " "06/24/2024         No echocardiogram results found for the past 12 months     No results found for: "FERRITIN"    RECORDS REVIEWED:      ASSESSMENT      PROBLEM DESCRIPTION/ Sx on Presentation  STATUS PLAN     Screening STACI   Presentation:     Wife reports that he snores and witnessed him having episodes of difficulty breathing. Is interested in getting tested for sleep apnea.    yes - snoring  yes - gasping arousals/coughing  yes - witnessed apneas, pauses in breathing    yes - night sweats    yes - AM headaches    no - dry mouth/sore throat      new   -we discussed sleep testing to evaluate for STACI     -discussed possible treatments for STACI including CPAP therapy       Daytime Sx     Does wake up some mornings feeling tired.  ESS 3/24 on intake    new   -will reassess sleepiness after evaluation for STACI     Insomnia     SLEEP SCHEDULE   Duration    Wind- down    Envmnt    CBTi    Meds prior    Meds now    Bed Time 11PM   Lights out    Latency 30 min   Arousals 1-2x/night   Back to sleep 10 min   Stim. ctrl    Wake time 5-6AM   Caffeine Lots of coffee   Naps 0   Nocturia 1   Work Professor at [employer]                     new   -will reassess after evaluation for sleep-disordered breathing       Nocturia     x 1 per sleep period    unclear          RTC:  will arrange RTC depending on results of sleep testing         PLAN      -recommend sleep testing   -HST ordered  -discussed trial therapy if STACI present and the patient is open to a trial of CPAP therapy  -discussed the etiology of obstructive sleep apnea as well as the potential ramifications of untreated sleep apnea, which could include daytime sleepiness, hypertension, heart disease and/or stroke. We discussed potential treatment options, which could include weight loss, body positioning, continuous positive airway pressure (CPAP), oral appliance, Inspire, or referral for surgical consideration.   -advised on strict driving precautions; advised never to drive " drowsy     Advised on plan of care. Answered all patient questions. Patient verbalized understanding and voiced agreement with plan of care.     RTC if dx of STACI made and CPAP ordered, will need follow up 31-90 days after receiving machine for compliance                None

## 2024-09-27 ENCOUNTER — TELEPHONE (OUTPATIENT)
Dept: SLEEP MEDICINE | Facility: OTHER | Age: 39
End: 2024-09-27
Payer: COMMERCIAL

## 2024-10-02 ENCOUNTER — CLINICAL SUPPORT (OUTPATIENT)
Dept: REHABILITATION | Facility: HOSPITAL | Age: 39
End: 2024-10-02
Payer: COMMERCIAL

## 2024-10-02 DIAGNOSIS — M25.662 DECREASED RANGE OF MOTION OF LEFT KNEE: Primary | ICD-10-CM

## 2024-10-02 DIAGNOSIS — R29.898 WEAKNESS OF LEFT LOWER EXTREMITY: ICD-10-CM

## 2024-10-02 DIAGNOSIS — R26.9 GAIT ABNORMALITY: ICD-10-CM

## 2024-10-02 PROCEDURE — 97530 THERAPEUTIC ACTIVITIES: CPT

## 2024-10-02 PROCEDURE — 97110 THERAPEUTIC EXERCISES: CPT

## 2024-10-21 ENCOUNTER — OFFICE VISIT (OUTPATIENT)
Dept: PSYCHIATRY | Facility: CLINIC | Age: 39
End: 2024-10-21
Payer: COMMERCIAL

## 2024-10-21 DIAGNOSIS — F41.1 GAD (GENERALIZED ANXIETY DISORDER): ICD-10-CM

## 2024-10-21 PROCEDURE — 90791 PSYCH DIAGNOSTIC EVALUATION: CPT | Mod: 95,,, | Performed by: PSYCHOLOGIST

## 2024-10-21 PROCEDURE — 3044F HG A1C LEVEL LT 7.0%: CPT | Mod: CPTII,95,, | Performed by: PSYCHOLOGIST

## 2024-10-21 NOTE — PROGRESS NOTES
Psychiatry Initial Visit (PhD/LCSW)  Diagnostic Interview - CPT 36811    Date: 10/21/2024    Site: Telemed    The patient location is: Patient's home/ Patient reported that his location at the time of this visit was in the [facility]     Visit type: Virtual visit with synchronous audio and video     Each patient to whom he or she provides medical services by telemedicine is: (1) informed of the relationship between the physician and patient and the respective role of any other health care provider with respect to management of the patient; and (2) notified that he or she may decline to receive medical services by telemedicine and may withdraw from such care at any time.     Referral source: self    Clinical status of patient: Outpatient    Jose Frost, a 39 y.o. male, for initial evaluation visit.  Met with patient.    Chief complaint/reason for encounter: attention deficit and anxiety    History of present illness: Patient reported symptoms of anxiety since adolescence.  Symptoms include decreased memory, excessive anxiety/worry, restlessness/keyed up, and panic attacks, weight gain (20 pounds in one month), insomnia, psychomotor agitation, worthlessness/guilt, easily distracted, and poor concentration.  He noted that he often gets distracted and loses track of time when completing tasks.  Patient was unable to remember whether he experienced distractibility and concentration problems during childhood, but denied hyperactivity.  Patient denied history of self-harm behaviors.  Patient denied current suicidal and homicidal ideations.     Pain: noncontributory    Symptoms:   Mood: weight gain, insomnia, psychomotor agitation, worthlessness/guilt, and poor concentration  Anxiety: decreased memory, excessive anxiety/worry, restlessness/keyed up, and panic attacks  Substance abuse: denied  Cognitive functioning: denied  Health behaviors: noncontributory    Psychiatric history: none    Medical history:  none    Family history of psychiatric illness: none    Social history (marriage, employment, etc.): Patient reported growing up in [place] with his mother and sister.  His father  when he was [age] years old.  He is the younger of two children.  He has one sister with who he has a distant relationship with.  He also reports having a distant relationship with his mother (who lives with him) as she does not share her feelings much.  He denied history of abuse and trauma during childhood.  His highest level of education is a doctorate in [employer].  He is currently employed as a professor at [employer].  He reported that he has been  for seven years and considers his wife to be supportive.  He has no children.      Substance use:   Alcohol:  up to five drinks (beer) on occasion once per month    Drugs: none   Tobacco: Cigarettes - occasional - once every four months   Caffeine: Coffee - up to three to five cups daily    Current medications and drug reactions (include OTC, herbal): see medication list     Strengths and liabilities: Strength: Patient is expressive/articulate., Liability: Patient lacks coping skills.    Current Evaluation:     Mental Status Exam:  General Appearance:  unremarkable, age appropriate   Speech: normal tone, normal rate, normal pitch, normal volume      Level of Cooperation: cooperative      Thought Processes: normal and logical   Mood: anxious      Thought Content: normal, no suicidality, no homicidality, delusions, or paranoia   Affect: congruent and appropriate   Orientation: Oriented x3   Memory: recent >  words after brief delay: 3 of 3 words, remote >  intact   Attention Span & Concentration: completed serial 7s adequately   Fund of General Knowledge: intact and appropriate to age and level of education   Judgment & Insight: fair     Language  intact     Diagnostic Impression - Plan:       ICD-10-CM ICD-9-CM   1. SHERLEY (generalized anxiety disorder)  F41.1 300.02        Plan:individual psychotherapy, consult psychiatrist for medication evaluation, and basic ADHD assessment - Discussed with patient that his concentration problems may be related to anxiety rather than attention deficit given that there did not appear to be a history of these problems in childhood.  Patient requested psychological testing for ADHD.    Return to Clinic: as scheduled     Length of Service (minutes): 60

## 2024-10-22 ENCOUNTER — PATIENT MESSAGE (OUTPATIENT)
Dept: PSYCHIATRY | Facility: CLINIC | Age: 39
End: 2024-10-22
Payer: COMMERCIAL

## 2024-10-25 ENCOUNTER — TELEPHONE (OUTPATIENT)
Dept: SLEEP MEDICINE | Facility: OTHER | Age: 39
End: 2024-10-25
Payer: COMMERCIAL

## 2024-11-18 ENCOUNTER — PATIENT MESSAGE (OUTPATIENT)
Dept: PSYCHIATRY | Facility: CLINIC | Age: 39
End: 2024-11-18
Payer: COMMERCIAL

## 2024-11-26 ENCOUNTER — TELEPHONE (OUTPATIENT)
Dept: SLEEP MEDICINE | Facility: OTHER | Age: 39
End: 2024-11-26
Payer: COMMERCIAL

## 2024-11-30 ENCOUNTER — PATIENT MESSAGE (OUTPATIENT)
Dept: PSYCHIATRY | Facility: CLINIC | Age: 39
End: 2024-11-30
Payer: COMMERCIAL

## 2024-12-03 ENCOUNTER — PATIENT MESSAGE (OUTPATIENT)
Dept: PSYCHIATRY | Facility: CLINIC | Age: 39
End: 2024-12-03
Payer: COMMERCIAL

## 2024-12-23 ENCOUNTER — TELEPHONE (OUTPATIENT)
Dept: SLEEP MEDICINE | Facility: OTHER | Age: 39
End: 2024-12-23
Payer: COMMERCIAL

## 2024-12-26 ENCOUNTER — HOSPITAL ENCOUNTER (OUTPATIENT)
Dept: SLEEP MEDICINE | Facility: OTHER | Age: 39
Discharge: HOME | End: 2024-12-26
Attending: NURSE PRACTITIONER
Payer: COMMERCIAL

## 2024-12-26 DIAGNOSIS — G47.19 OTHER HYPERSOMNIA: ICD-10-CM

## 2024-12-26 DIAGNOSIS — R06.83 SNORING: ICD-10-CM

## 2024-12-26 PROCEDURE — 95800 SLP STDY UNATTENDED: CPT | Mod: 52

## 2024-12-27 PROBLEM — R06.83 SNORING: Status: ACTIVE | Noted: 2024-12-27

## 2024-12-31 DIAGNOSIS — G47.33 OBSTRUCTIVE SLEEP APNEA: Primary | ICD-10-CM

## 2025-01-15 ENCOUNTER — OFFICE VISIT (OUTPATIENT)
Dept: PSYCHIATRY | Facility: CLINIC | Age: 40
End: 2025-01-15
Payer: COMMERCIAL

## 2025-01-15 DIAGNOSIS — Z00.8 ENCOUNTER FOR PSYCHOLOGICAL EVALUATION: Primary | ICD-10-CM

## 2025-01-15 DIAGNOSIS — F41.1 GAD (GENERALIZED ANXIETY DISORDER): ICD-10-CM

## 2025-01-15 PROCEDURE — 96130 PSYCL TST EVAL PHYS/QHP 1ST: CPT | Mod: 95,,,

## 2025-01-15 PROCEDURE — 90791 PSYCH DIAGNOSTIC EVALUATION: CPT | Mod: 95,,,

## 2025-01-15 PROCEDURE — 96131 PSYCL TST EVAL PHYS/QHP EA: CPT | Mod: 95,,,

## 2025-01-15 PROCEDURE — 96139 PSYCL/NRPSYC TST TECH EA: CPT | Mod: NDTC,,,

## 2025-01-15 PROCEDURE — 96138 PSYCL/NRPSYC TECH 1ST: CPT | Mod: NDTC,,,

## 2025-01-16 ENCOUNTER — PATIENT MESSAGE (OUTPATIENT)
Dept: PSYCHIATRY | Facility: CLINIC | Age: 40
End: 2025-01-16
Payer: COMMERCIAL

## 2025-01-16 NOTE — PROGRESS NOTES
The patient location is: [place]  The chief complaint leading to consultation is: encounter for ADHD evaluation    Visit type: audiovisual    Each patient to whom he or she provides medical services by telemedicine is:  (1) informed of the relationship between the physician and patient and the respective role of any other health care provider with respect to management of the patient; and (2) notified that he or she may decline to receive medical services by telemedicine and may withdraw from such care at any time.    Virtual Visit  The patient location is: [place]   The chief complaint leading to consultation is: ADHD Evaluation    Visit type: audiovisual    Face to Face time with patient: 60 minutes of total time spent on the encounter, which includes face to face time and non-face to face time preparing to see the patient (e.g., review of tests), Obtaining and/or reviewing separately obtained history, Documenting clinical information in the electronic or other health record, Independently interpreting results (not separately reported) and communicating results to the patient/family/caregiver, or Care coordination (not separately reported). Testing codes and duration below.    Each patient to whom he or she provides medical services by telemedicine is:  (1) informed of the relationship between the physician and patient and the respective role of any other health care provider with respect to management of the patient; and (2) notified that he or she may decline to receive medical services by telemedicine and may withdraw from such care at any time.      [facility] [address] [place] [address]  [phone]    REPORT OF PSYCHOLOGICAL TESTING    NAME: Jose Frost  MRN: [id number]  : 1985     REFERRED BY: Diane Marley, Ph.D.    REASON FOR REFERRAL: Psychological Evaluation of ADHD    EVALUATED BY:  Sharon Hess, Ph.D., Clinical Psychologist  Destini Sanchez B.SElla,  Psychometrician    DATES OF EVALUATION: 11/25/2024; 01/15/2025    EVALUATION PROCEDURES AND TIMES:  Conducted by Psychologist:  Integration of patient data, interpretation of standardized test results and clinical data, clinical decision-making, treatment planning and report, and feedback to the referring provider  CPT Codes: 18933 - 1 hour; 42092 - 2 hours  Conducted by Technician:  Psychological test administration and scoring by technician, two or more tests, any method: [place] Verbal Learning Test, Third Edition (CVLT3); Wender [place] Rating Scale for Attention-Deficit/Hyperactivity Disorder (WURS); Mina' Continuous Performance Test 3rd Edition (CPT 3); Mina' Adult ADHD Rating Scales: Short Version (CAARS-S:S); Personal Health Questionnaire Depression Scale (PHQ-8); Generalized Anxiety Disorder-7 (SHERLEY-7)  CPT Codes: 94710 - 30 minutes; 09133 - 1 hour    REFERRAL INFORMATION:  Mr. Frost is a 39-year-old male who was referred for a psychological evaluation of Attention Deficit Hyperactivity Disorder (ADHD) by Diane Marley, Ph.D.    Mr. Frost has no prior diagnosis of ADHD or learning disability and has no history of formal psychological testing. He reported that he has experienced potential ADHD symptomology since childhood, including distractibility, difficulty sustaining attention, not listening when spoken to directly, disorganization, poor time management, avoidance of tasks that require sustained mental effort, and forgetfulness. He also described losing things frequently and noted symptoms including fidgeting, restlessness, and feeling driven by a motor.     When asked about these difficulties, Mr. Frost noted that he struggles to sustain attention and to listen when spoken to directly because he frequently experiences intrusive and racing thoughts from the moment I wake up. He described feeling distracted by thoughts about work and bad conversations that I replay in my head. He  described ruminating on mistakes and rehearsing future conversations. He frequently feels anxious, worried, and on edge. He noted that he feels that intrusive thoughts and anxious preoccupation contribute to forgetfulness, tendency to lose things, and procrastination. He paces and walks often in an effort to reduce restlessness and stress.     Similarly, Ms. Frost reported that he felt distracted by worry and thoughts about performance in childhood. He noted that in childhood he planned for future events and replayed situations that went well. He described feeling stressed by school and about his assignments. He also described engaging in binge eating and stress eating during childhood.     Mr. Frost reported that potential ADHD-related symptoms affected him academically during childhood and that he was frequently worried about school. He described procrastination on assignments and forgetting to take books or homework to school. Currently, Mr. Frost feels that these symptoms affect him in work, in his relationships, and in his home life. At work, he described challenges including running late, procrastinating, and struggling to manage his workload. Relationally, he has received feedback from his mother and his wife that he does not listen well and forgets information that has previously been shared with him. In his home life, he forgets to pay bills, skips sections when filling out forms, and loses items such as his phone, keys, headphones, and jacket.     BACKGROUND INFORMATION:  Education and Occupational. Mr. Frost reported that he performed well academically throughout schooling. However, he reported that he frequently experienced significant anxiety about schooling. He achieved a doctoral degree in computer science. He is currently employed full time as a .    Family and Social. Mr. Frost was born and raised in [facility] [place] [facility] by his biological mother along with an older sister. His  father passed away when he was [age] years old. He denied experiencing abuse or neglect in childhood. He described emotionally distant relationships with his mother and sister. Mr. Frost has been  to his wife of 7 years and reported that she serves as a source of support. He has no children. He currently lives with his wife and mother.     Medical and Mental Health. Mr. Frost reported no significant physical health concerns. Mr. Frost has no history of psychiatric care. He participated in an intake session of individual psychotherapy with clinician Diane Marley, Ph.D., in October 2024. At this time, he was diagnosed with Generalized Anxiety Disorder. He reported no family history of mental health concerns. He reported consuming approximately five alcoholic beverages per month. He denied use of illicit substances. He reported occasional tobacco use, once every four months. He consumes approximately three to five cups of coffee daily.     TEST DATA:  All tests were administered according to standardized procedures and were selected based on the reason for referral. Mr. Frost signed a test approach agreement to approach testing with honest, genuine effort.      CVLT3. The [place] Verbal Learning Test, Third Edition (CVLT3) is a measure of verbal learning that is used to assess ADHD. There were no validity concerns regarding effort.   TEST RESULTS. Mr. Frost's results suggest clinically significant difficulties with immediate memory and immediate attention capacity. His scores also suggest that he experiences significant difficulties with recalling information and with delayed memory. His scores do not suggest deficits in recognition memory. Overall, Mr. Pages scores suggest low savings. These types of difficulties may suggest the presence of psychiatric conditions.    CPT 3. The Mina Continuous Performance Test 3rd Edition (CPT 3) is a computerized assessment of ADHD-related problems  including inattentiveness, impulsivity, sustained attention, and vigilance. There were no validity concerns regarding effort.   TEST RESULTS. Mr. Frost's responses indicated no problems with inattentiveness, impulsivity, or sustained attention. His results indicate some support for a problem with maintaining vigilance, as he had some problems with performance on trials with longer intervals between stimuli. Overall, his results do not suggest that he experiences a disorder characterized by attention deficits such as ADHD.     SELF-REPORT MEASURES.  WURS. The Wender [place] Rating Scale (WURS) is a retrospective self-report measure to evaluate the presence and severity of childhood symptoms of ADHD. Mr. Frost's scores do not suggest that he experienced clinically significant symptoms related to ADHD in childhood.  CAARS-S:S. The Mina' Adult ADHD Rating Scales: Short Version is a self-report measure to assess ADHD-related symptoms. His profile indicated significant inattention/memory problems, restlessness, and overall ADHD symptomology. His scores for impulsivity and problems with self-concept were not clinically significant.  PHQ-8. His score on the PHQ-8, a measure of depressive symptomology, was a 1, which indicates that Mr. Frost experiences no depressive symptoms.  SHERLEY-7. His score on the SHERLEY-7, a measure of anxious symptomology, was a 14, which indicates that Mr. Frost experiences moderate anxiety. He endorsed symptoms including nervousness, feeling on edge, excessive worry about numerous areas of life, difficulty controlling worry, difficulty relaxing, restlessness, and feeling afraid that something awful may happen.     OTHER REPORTS. Mr. Frost provided consent for his wife to complete forms regarding his current functioning. Mr. Frost's wife indicated that he experiences symptoms including difficulty sustaining attention, difficulty listening when spoken to directly, failure to follow through on  tasks, difficulty organizing, avoidance of tasks that require sustained mental effort, distractibility, and forgetfulness. She also noted that he fidgets often, loses things frequently, and is restless and on the go. She reported that he daydreams often, is frequently confused, and does not seem to process information as quickly as others. She noted that these symptoms began when Mr. Frost was 32 years old and affect him in his home, work, and social relationships.     Mr. Frost also provided consent for his sister to complete forms regarding his childhood functioning. However, Mr. Frost's sister did not respond to requests to provide collateral information.     DIAGNOSTIC IMPRESSIONS:    ICD-10-CM ICD-9-CM   1. Encounter for psychological evaluation  Z00.8 V72.85   2. SHERLEY (generalized anxiety disorder)  F41.1 300.02        SUMMARY AND RECOMMENDATIONS:  Mr. Frost was referred for psychological evaluation by Diane Marley, Ph.D. due to potential reported symptoms of ADHD. Mr. Frost reported distractibility, difficulty sustaining attention, not listening when spoken to directly, disorganization, poor time management, avoidance of tasks that require sustained mental effort, and forgetfulness. He also described losing things frequently and noted symptoms including fidgeting, restlessness, and feeling driven by a motor. He reported that these symptoms impacted him academically during childhood and currently impact him occupationally, socially, and in his home life. Notably, Mr. Frost indicated that his symptoms are influenced strongly by stress and anxiety. In both childhood and adulthood, Mr. Frost's difficulties with sustained attention and distractibility occur due to racing thoughts, thoughts about negative experiences, and stress. He described frequently feeling anxious, worried, and on edge. He noted that he feels that intrusive thoughts and anxious preoccupation contribute to forgetfulness,  tendency to lose things, and procrastination.    While Mr. Frost's self-report measure of current ADHD-related symptomology is clinically significant, his test results on all other measures do not suggest the presence of a disorder characterized by attention deficits such as ADHD. His test results indicate clinically significant anxiety.      Based on this evaluation, Mr. Frost will not be assigned a diagnosis of ADHD. Rather, his difficulties with distractibility, inattention, forgetfulness, disorganization, procrastination, and other related symptoms can be best explained by clinically significant anxiety. A diagnosis of ADHD requires evidence of significant distress and impairment in two or more domains with symptoms starting prior to age 12. There is insufficient evidence to support clinically significant impairment related to two areas of functioning during childhood. Mr. Frost reported impairment limited to one domain (school). Additionally, his wife reported that his symptoms did not begin until age 32. Mr. Frost's test results do not suggest that he experiences ADHD but rather indicate anxious symptoms. Anxiety negatively impacts areas of functioning including concentration, focus, organization, and memory. Anxiety also contributes to restlessness, fidgeting, and feeling driven by a motor.     Mr. Frost will be assigned a diagnosis of Generalized Anxiety Disorder. Mr. Frost would likely benefit from psychological and psychiatric care aimed at addressing anxious symptomology. To this end, Mr. Frost will be referred to psychiatric care and short-term therapy options through the [facility].     Report and interpretation and coding were completed on 01/18/2025.       Sharon Hess, Ph.D.

## 2025-01-18 ENCOUNTER — PATIENT MESSAGE (OUTPATIENT)
Dept: PSYCHIATRY | Facility: CLINIC | Age: 40
End: 2025-01-18
Payer: COMMERCIAL

## 2025-01-24 ENCOUNTER — PATIENT MESSAGE (OUTPATIENT)
Dept: PSYCHIATRY | Facility: CLINIC | Age: 40
End: 2025-01-24
Payer: COMMERCIAL

## 2025-02-03 ENCOUNTER — OFFICE VISIT (OUTPATIENT)
Dept: PSYCHIATRY | Facility: CLINIC | Age: 40
End: 2025-02-03
Payer: COMMERCIAL

## 2025-02-03 VITALS
BODY MASS INDEX: 27.9 KG/M2 | DIASTOLIC BLOOD PRESSURE: 88 MMHG | WEIGHT: 188.38 LBS | HEART RATE: 56 BPM | HEIGHT: 69 IN | SYSTOLIC BLOOD PRESSURE: 137 MMHG

## 2025-02-03 DIAGNOSIS — F41.1 GAD (GENERALIZED ANXIETY DISORDER): ICD-10-CM

## 2025-02-03 DIAGNOSIS — F90.2 ATTENTION DEFICIT HYPERACTIVITY DISORDER (ADHD), COMBINED TYPE: Primary | ICD-10-CM

## 2025-02-03 PROCEDURE — 99999 PR PBB SHADOW E&M-EST. PATIENT-LVL III: CPT | Mod: PBBFAC,,,

## 2025-02-03 PROCEDURE — 3075F SYST BP GE 130 - 139MM HG: CPT | Mod: CPTII,S$GLB,,

## 2025-02-03 PROCEDURE — 99205 OFFICE O/P NEW HI 60 MIN: CPT | Mod: S$GLB,,,

## 2025-02-03 PROCEDURE — 3079F DIAST BP 80-89 MM HG: CPT | Mod: CPTII,S$GLB,,

## 2025-02-03 PROCEDURE — 3008F BODY MASS INDEX DOCD: CPT | Mod: CPTII,S$GLB,,

## 2025-02-03 RX ORDER — LISDEXAMFETAMINE DIMESYLATE 30 MG/1
30 CAPSULE ORAL EVERY MORNING
Qty: 30 CAPSULE | Refills: 0 | Status: SHIPPED | OUTPATIENT
Start: 2025-02-03 | End: 2025-03-05

## 2025-02-03 NOTE — PROGRESS NOTES
I have reviewed the notes, assessments, and/or procedures performed by the resident, I concur with her/his documentation of Jose Frost.  Date of Service: 2/3/2025

## 2025-02-03 NOTE — PROGRESS NOTES
"OUTPATIENT PSYCHIATRY INITIAL VISIT    ENCOUNTER DATE:  2/3/2025  SITE:  [facility]  REFFERAL SOURCE:  Self, Aaareferral  LENGTH OF SESSION:  45 minutes        CHIEF COMPLAINT:   Anxiety and ADHD    HISTORY OF PRESENTING ILLNESS:  Jose Frost is a 39 y.o. male with history of SHERLEY who presents for initial assessment.      History as told by Patient       Distracted and cannot seem to get organized. Has to manage a ton of things for his job in terms of assignments, projects, and writing. Works as a  at \[employer]\"".    He characterizes his anxiety as ontinuous ongoing stress. Says that he wakes up with his mind having a lot of thoughts. Thought that he was a very self-centered person for most of his life in that he was worried about finer details of everything all the time.   Moved to  2010 for graduate school. Transition to [place] culture was "fine" and sister lived in [place].   Computer science- teaches undergraduates and graduates. Loves his job.   Family: Wife is pregnant and he is stressed out about it. Pt lives in  and works remotely.   Work hours: wakes up at 5AM and ends work at 6PM  Depression: Denies alls sx. Satisfied with his family and career life.   Sleep: In bed 11PM; sleeps 11:15PM. Late night, watches TV.   Lost father at 5 yo. Mother was very impersonal growing up. Does not feel his childhood upbringing has affected his mental health too much.   Social [place]: friends close by    Pharmacy: [facility]      PSYCHIATRIC REVIEW OF SYMPTOMS: (Is patient experiencing or having changes in any of the following?)    Symptoms of Depression: denies    Patient Health Questionnaire-2 (PHQ-2)  Over the last 2 weeks, how often have you been bothered by the following problems?    Little interest or pleasure in doing things + 0 - Not at all   Feeling down, depressed, and hopeless + 0 - Not at all   Total score (>3 considered positive screen) 0 "   Follow up positive screen with PHQ-9    Patient Health Questionnaire-9 (PHQ-9)  Over the last 2 weeks, how often have you been bothered by the following problems?    Little interest or pleasure in doing things + 0 - Not at all   Feeling down, depressed, and hopeless + 0 - Not at all   Trouble falling or staying asleep, or sleeping too much + 0 - Not at all   Feeling tired or having little energy + 0 - Not at all   Poor appetite or overeating + 0 - Not at all   Feeling bad about yourself - or that you are a failure or have let yourself or your family down + 0 - Not at all   Trouble concentrating on things, such as reading the newspaper or watching television +3 - Nearly every day   Moving or speaking so slowly that other people could have noticed? Or so fidgety or restless that you have been moving a lot more than usual +1 - Several days   Thoughts that you would be better off dead, or thoughts of hurting yourself in some way + 0 - Not at all       Total score 4    No or minimal depression (0-4)       Symptoms of SHERLEY: excessive anxiety/worry/fear, more days than not, about numerous issues, difficult to control, with restlessness, fatigue, poor concentration, irritability, muscle tension, sleep disturbance; causes functionally impairing distress   Onset was approximately a few months ago. Symptoms have been gradually worsening since that time.   Current symptoms include:    Generalized Anxiety Disorder 7-item (GAD7)  Over the last 2 weeks, how often have you been bothered by the following problems?    Feeling nervous, anxious or on edge +3 - Nearly every day   Not being able to stop or control worrying +3 - Nearly every day   Worrying too much about different things +3 - Nearly every day   Trouble relaxing +3 - Nearly every day   Being so restless that it is hard to sit still +3 - Nearly every day   Becoming easily annoyed or irritable + 0 - Not at all   Feeling afraid as if something awful might happen + 0 - Not at  "all       Total score 15    Severe anxiety (15+)       Symptoms of Panic Attacks: denies    Symptoms of evelin or hypomania: denies    Symptoms of psychosis: denies    Symptoms of PTSD: denies    Sleep: early morning awakening for work    Other Psych ROS:    Symptoms of OCD: obsessions, compulsions or ruminations    Symptoms of Eating Disorders: anorexia, bulimia or binge eating    Symptoms of ADHD: inattention or hyperactivity    Adult ADHD Self-Report Scale    PART A: Screening  How often do you have trouble wrapping up the final details of a project,  once the challenging parts have been done? Very often   How often do you have difficulty getting things in order when you have to do  a task that requires organization? Very often   How often do you have problems remembering appointments or obligations? Very often   Score number of items more frequent than "Rarely" 3   When you have a task that requires a lot of thought, how often do you avoid  or delay getting started? Very often   How often do you fidget or squirm with your hands or feet when you have  to sit down for a long time? Very often   How often do you feel overly active and compelled to do things, like you  were driven by a motor? Very often   Score number of items more frequent than "Sometimes" 3   Total score (>4 indicative of ADHD) 6     PART B: Additional symptoms, no score  How often do you make careless mistakes when you have to work on a boring or  difficult project? Rarely   How often do you have difficulty keeping your attention when you are doing boring  or repetitive work? Often   How often do you have difficulty concentrating on what people say to you,  even when they are speaking to you directly? Often   How often do you misplace or have difficulty finding things at home or at work? Very often   How often are you distracted by activity or noise around you? Very often   How often do you leave your seat in meetings or other situations in " which  you are expected to remain seated? Often   How often do you feel restless or fidgety? Often   How often do you have difficulty unwinding and relaxing when you have time  to yourself? Often   How often do you find yourself talking too much when you are in social situations? Often   When youre in a conversation, how often do you find yourself finishing  the sentences of the people you are talking to, before they can finish  them themselves? Very often   How often do you have difficulty waiting your turn in situations when  turn taking is required? Often   How often do you interrupt others when they are busy? Very often     Described impairments: work, tasks at home  Childhood history or previous diagnosis: never got testing as a child      Risk Parameters:  Patient reports no suicidal ideation  Patient reports no homicidal ideation  Patient reports no self-injurious behavior  Patient reports no violent behavior    PSYCHIATRIC MED REVIEW    Current psych meds  Medication side effects:  NA  Medication compliance:  NA    Previous psych meds trials    PAST PSYCHIATRIC HISTORY:  Previous Psychiatric Diagnoses:  anxiety  Previous Psychiatric Hospitalizations:  denies   Previous SI/HI:  denies  Previous Suicide Attempts:  denies   Previous Self injurious behaviors: denies   Previous Medication Trials:  denies  Psychiatric Care (current & past):  denies  History of Psychotherapy:  yes, not anymore  History of Violence:  denies    SUBSTANCE ABUSE HISTORY:  Caffeine: 2 cups, latest 2PM  Tobacco:  denies  Alcohol: once month, 3-4 beers  Illicit Substances:  denies  Misuse of Prescription Medications:  denies  Other: Herbal supplements/ online supplements: Multivitamins    FAMILY HISTORY:  Psychiatric:  niece-autism   Family H/o suicide:  denies    SOCIAL HISTORY:  Developmental/Childhood:Achieved all developmental milestones timely  Education: graduate school  Employment Status/Finances:Employed   Relationship  Status/Sexual Orientation: : Relationship intact  Children: 0  Housing Status: [facility]   history:  NO  Access to Firearms: NO  Sabianist:Non-spiritual  Recreational activities: Used to play tennis; got an injury and did not get back into it. Watching TV, running.     LEGAL HISTORY:   Past charges/incarcerations: No   Pending charges:No     NEUROLOGIC HISTORY:  Seizures:  denies   Head trauma:  denies    MEDICAL REVIEW OF SYSTEMS:  Complete review of systems performed covering Constitutional, Cardiovascular, Respiratory, Gastrointestinal, Musculoskeletal, Skin, Neurologic and Endocrine  All systems negative/ except for .    MEDICAL HISTORY:  Past Medical History:   Diagnosis Date    Mild intermittent asthma, uncomplicated        ALL MEDICATIONS:    Current Outpatient Medications:     acetaminophen (TYLENOL) 500 MG tablet, Take 500 mg by mouth every 6 (six) hours as needed for Pain. (Patient not taking: Reported on 9/25/2024), Disp: , Rfl:     aspirin (ECOTRIN) 81 MG EC tablet, Take 1 tablet (81 mg total) by mouth once daily. For 4 weeks starting the day after surgery. (Patient not taking: Reported on 9/25/2024), Disp: 28 tablet, Rfl: 0    HYDROcodone-acetaminophen (NORCO)  mg per tablet, Take 1 tablet by mouth every 4-6 hours for pain. (Patient not taking: Reported on 6/21/2024), Disp: 12 tablet, Rfl: 0    multivitamin (THERAGRAN) per tablet, Take 1 tablet by mouth once daily., Disp: , Rfl:     promethazine (PHENERGAN) 25 MG tablet, Take 1 tablet (25 mg total) by mouth every 6 (six) hours as needed for Nausea. (Patient not taking: Reported on 9/25/2024), Disp: 8 tablet, Rfl: 0    traMADoL (ULTRAM) 50 mg tablet, Take 1-2 tablets ( mg total) by mouth every 6 (six) hours as needed for Pain. (Patient not taking: Reported on 6/21/2024), Disp: 21 tablet, Rfl: 0    ALLERGIES:  Review of patient's allergies indicates:  No Known Allergies    RELEVANT LABS/STUDIES:    Lab Results   Component  Value Date    WBC 5.10 06/24/2024    HGB 13.4 (L) 06/24/2024    HCT 41.5 06/24/2024    MCV 85 06/24/2024     06/24/2024     BMP  Lab Results   Component Value Date     (L) 06/24/2024    K 4.7 06/24/2024     06/24/2024    CO2 25 06/24/2024    BUN 15 06/24/2024    CREATININE 0.9 06/24/2024    CALCIUM 10.0 06/24/2024    ANIONGAP 8 06/24/2024     Lab Results   Component Value Date    ALT 35 06/24/2024    AST 25 06/24/2024    ALKPHOS 76 06/24/2024    BILITOT 0.5 06/24/2024     Lab Results   Component Value Date    TSH 1.716 06/24/2024     Lab Results   Component Value Date    HGBA1C 5.3 06/24/2024         VITALS  There were no vitals filed for this visit.    PHYSICAL EXAM  General: well developed  Neurologic:   Gait: Normal   Psychomotor signs:  No involuntary movements or tremor  AIMS: none    PSYCHIATRIC EXAM:     Mental Status Exam:  Appearance: unremarkable, age appropriate  Behavior/Cooperation: normal, cooperative  Speech: normal tone, normal rate, normal pitch, normal volume  Language: uses words appropriately; NO aphasia or dysarthria  Mood: OK  Affect: appropriate, mood-congruent  Thought Process: normal and logical  Thought Content: normal, no suicidality, no homicidality, delusions, or paranoia  Level of Consciousness: Alert and Oriented x3  Memory:  Intact  Attention/concentration: appropriate for age/education.   Fund of Knowledge: intact to conversation  Insight:  Intact - patient has awareness of current condition(s)  Judgment: Intact - behavior adequate for circumstances       IMPRESSION:    Jose Frost is a 39 y.o. male with history of Anxiety who presents for initial assessment.    DIAGNOSES:    ICD-10-CM ICD-9-CM   1. Attention deficit hyperactivity disorder (ADHD), combined type  F90.2 314.01   2. SHRELEY (generalized anxiety disorder)  F41.1 300.02       PLAN:    Start vyvanse 30mg daily for ADHD  Will switch to Lexapro in case of failed trial with stimulants   PMDP reviewed:  yes, no discrepancies   Lifestyle modifications to reduce symptoms non-pharmacologically (ex: sleep, exercise, diet, etc).  Establishing with psychotherapy  Limit substance use as drugs/alcohol can exacerbate psychiatric symptoms.  Sleep hygiene discussed    RTC 1 month or sooner if needed    Discussed with patient verbal informed consent including: risks and benefits of proposed treatment above vs. alternative treatments vs. no treatment, serious and common side effects of these respective treatments, as well as the inherent unpredictability of individual responses to any treatments, contingency plans if adverse reactions occur, precautions in which to me through Epic MyChart portal or call the clinic, and precautions in which to call 911 and/or go to the emergency room. The patient expresses understanding of the above and displays the capacity to agree with this current plan. All questions were answered.    Case discussed & seen by staff psychiatrist: Dr. Patrick.    Total of 65 minutes spent today on encounter, including chart review,  review, seeing patient, documenting encounter, ordering medications.    Eduardo Baugh MD  [facility], PGY-III  [facility]

## 2025-02-20 ENCOUNTER — PATIENT MESSAGE (OUTPATIENT)
Dept: PSYCHIATRY | Facility: CLINIC | Age: 40
End: 2025-02-20
Payer: COMMERCIAL

## 2025-03-17 ENCOUNTER — OFFICE VISIT (OUTPATIENT)
Dept: PSYCHIATRY | Facility: CLINIC | Age: 40
End: 2025-03-17
Payer: COMMERCIAL

## 2025-03-17 VITALS
DIASTOLIC BLOOD PRESSURE: 88 MMHG | SYSTOLIC BLOOD PRESSURE: 133 MMHG | WEIGHT: 187.19 LBS | BODY MASS INDEX: 27.64 KG/M2 | HEART RATE: 63 BPM

## 2025-03-17 DIAGNOSIS — F90.2 ATTENTION DEFICIT HYPERACTIVITY DISORDER (ADHD), COMBINED TYPE: Primary | ICD-10-CM

## 2025-03-17 PROCEDURE — 99999 PR PBB SHADOW E&M-EST. PATIENT-LVL II: CPT | Mod: PBBFAC,,,

## 2025-03-17 PROCEDURE — 99214 OFFICE O/P EST MOD 30 MIN: CPT | Mod: S$GLB,,,

## 2025-03-17 PROCEDURE — 3079F DIAST BP 80-89 MM HG: CPT | Mod: CPTII,S$GLB,,

## 2025-03-17 PROCEDURE — 3008F BODY MASS INDEX DOCD: CPT | Mod: CPTII,S$GLB,,

## 2025-03-17 PROCEDURE — 3075F SYST BP GE 130 - 139MM HG: CPT | Mod: CPTII,S$GLB,,

## 2025-03-17 RX ORDER — LISDEXAMFETAMINE DIMESYLATE 40 MG/1
40 CAPSULE ORAL DAILY
Qty: 30 CAPSULE | Refills: 0 | Status: SHIPPED | OUTPATIENT
Start: 2025-03-17 | End: 2025-04-16

## 2025-03-17 RX ORDER — LISDEXAMFETAMINE DIMESYLATE 40 MG/1
40 CAPSULE ORAL DAILY
Qty: 30 CAPSULE | Refills: 0 | Status: SHIPPED | OUTPATIENT
Start: 2025-04-16 | End: 2025-05-16

## 2025-03-17 NOTE — PROGRESS NOTES
I have reviewed the notes, assessments, and/or procedures performed by the resident, I concur with her/his documentation of Jose Frost.  Date of Service: 3/17/2025

## 2025-03-17 NOTE — PROGRESS NOTES
OUTPATIENT PSYCHIATRY Follow-up VISIT    ENCOUNTER DATE:  3/17/2025  SITE:  [facility]  LENGTH OF SESSION:  25 minutes        CHIEF COMPLAINT:   Anxiety and ADHD    HISTORY OF PRESENTING ILLNESS:  Jose Frost is a 39 y.o. male with history of SHERLEY who presents for initial assessment.      History as told by Patient       Patient reports that he has noticed a tremendous benefit and starting the Vyvanse and that he can now think clearly and stay on task.  Patient mentions his intense anxiety from ADHD symptoms in which she was always worried about what he might eat later in the day and what stops he would have to make on his way to work but reports that he was able to just listen to music and have a smooth morning.  Patient denies any side effects currently and is interested in increasing the dose of his Vyvanse.    Pharmacy: [facility]      PSYCHIATRIC REVIEW OF SYMPTOMS: (Is patient experiencing or having changes in any of the following?)    Symptoms of Depression: denies      Symptoms of SHERLEY: excessive anxiety/worry/fear, more days than not, about numerous issues, difficult to control, with restlessness, fatigue, poor concentration, irritability, muscle tension, sleep disturbance; causes functionally impairing distress   Onset was approximately a few months ago. Symptoms have been gradually worsening since that time.   Current symptoms include:    Generalized Anxiety Disorder 7-item (GAD7)  Over the last 2 weeks, how often have you been bothered by the following problems?    Feeling nervous, anxious or on edge + 0 - Not at all   Not being able to stop or control worrying +1 - Several days   Worrying too much about different things +1 - Several days   Trouble relaxing +1 - Several days   Being so restless that it is hard to sit still +1 - Several days   Becoming easily annoyed or irritable + 0 - Not at all   Feeling afraid as if something awful might happen + 0 - Not at  all       Total score 4    Minimal anxiety (0-4)       Symptoms of Panic Attacks: denies    Symptoms of evelin or hypomania: denies    Symptoms of psychosis: denies    Symptoms of PTSD: denies    Sleep: early morning awakening for work        Risk Parameters:  Patient reports no suicidal ideation  Patient reports no homicidal ideation  Patient reports no self-injurious behavior  Patient reports no violent behavior    PSYCHIATRIC MED REVIEW    Current psych meds  Medication side effects:  denies  Medication compliance:  yes    Previous psych meds trials      MEDICAL REVIEW OF SYSTEMS:  Complete review of systems performed covering Constitutional, Cardiovascular, Respiratory, Gastrointestinal, Musculoskeletal, Skin, Neurologic and Endocrine  All systems negative/ except for .    MEDICAL HISTORY:  Past Medical History:   Diagnosis Date    Mild intermittent asthma, uncomplicated        ALL MEDICATIONS:    Current Outpatient Medications:     acetaminophen (TYLENOL) 500 MG tablet, Take 500 mg by mouth every 6 (six) hours as needed for Pain. (Patient not taking: Reported on 9/25/2024), Disp: , Rfl:     aspirin (ECOTRIN) 81 MG EC tablet, Take 1 tablet (81 mg total) by mouth once daily. For 4 weeks starting the day after surgery. (Patient not taking: Reported on 9/25/2024), Disp: 28 tablet, Rfl: 0    HYDROcodone-acetaminophen (NORCO)  mg per tablet, Take 1 tablet by mouth every 4-6 hours for pain. (Patient not taking: Reported on 6/21/2024), Disp: 12 tablet, Rfl: 0    lisdexamfetamine (VYVANSE) 30 MG capsule, Take 1 capsule (30 mg total) by mouth every morning., Disp: 30 capsule, Rfl: 0    multivitamin (THERAGRAN) per tablet, Take 1 tablet by mouth once daily., Disp: , Rfl:     promethazine (PHENERGAN) 25 MG tablet, Take 1 tablet (25 mg total) by mouth every 6 (six) hours as needed for Nausea. (Patient not taking: Reported on 9/25/2024), Disp: 8 tablet, Rfl: 0    traMADoL (ULTRAM) 50 mg tablet, Take 1-2 tablets (  mg total) by mouth every 6 (six) hours as needed for Pain. (Patient not taking: Reported on 6/21/2024), Disp: 21 tablet, Rfl: 0    ALLERGIES:  Review of patient's allergies indicates:  No Known Allergies    RELEVANT LABS/STUDIES:    Lab Results   Component Value Date    WBC 5.10 06/24/2024    HGB 13.4 (L) 06/24/2024    HCT 41.5 06/24/2024    MCV 85 06/24/2024     06/24/2024     BMP  Lab Results   Component Value Date     (L) 06/24/2024    K 4.7 06/24/2024     06/24/2024    CO2 25 06/24/2024    BUN 15 06/24/2024    CREATININE 0.9 06/24/2024    CALCIUM 10.0 06/24/2024    ANIONGAP 8 06/24/2024     Lab Results   Component Value Date    ALT 35 06/24/2024    AST 25 06/24/2024    ALKPHOS 76 06/24/2024    BILITOT 0.5 06/24/2024     Lab Results   Component Value Date    TSH 1.716 06/24/2024     Lab Results   Component Value Date    HGBA1C 5.3 06/24/2024         VITALS  Vitals:    03/17/25 0943   BP: 133/88   Pulse: 63   Weight: 84.9 kg (187 lb 2.7 oz)       PHYSICAL EXAM  General: well developed  Neurologic:   Gait: Normal   Psychomotor signs:  No involuntary movements or tremor  AIMS: none    PSYCHIATRIC EXAM:     Mental Status Exam:  Appearance: unremarkable, age appropriate  Behavior/Cooperation: normal, cooperative  Speech: normal tone, normal rate, normal pitch, normal volume  Language: uses words appropriately; NO aphasia or dysarthria  Mood: OK  Affect: appropriate, mood-congruent  Thought Process: normal and logical  Thought Content: normal, no suicidality, no homicidality, delusions, or paranoia  Level of Consciousness: Alert and Oriented x3  Memory:  Intact  Attention/concentration: appropriate for age/education.   Fund of Knowledge: intact to conversation  Insight:  Intact - patient has awareness of current condition(s)  Judgment: Intact - behavior adequate for circumstances       IMPRESSION:    Jose Frost is a 39 y.o. male with history of Anxiety who presents for initial  assessment.    DIAGNOSES:    ICD-10-CM ICD-9-CM   1. Attention deficit hyperactivity disorder (ADHD), combined type  F90.2 314.01         PLAN:    Cont vyvanse 30mg daily for ADHD  Will switch to Lexapro in case of failed trial with stimulants   PMDP reviewed: yes, no discrepancies   Lifestyle modifications to reduce symptoms non-pharmacologically (ex: sleep, exercise, diet, etc).  Establishing with psychotherapy  Limit substance use as drugs/alcohol can exacerbate psychiatric symptoms.  Sleep hygiene discussed    RTC 1 month or sooner if needed    Discussed with patient verbal informed consent including: risks and benefits of proposed treatment above vs. alternative treatments vs. no treatment, serious and common side effects of these respective treatments, as well as the inherent unpredictability of individual responses to any treatments, contingency plans if adverse reactions occur, precautions in which to me through Epic MyChart portal or call the clinic, and precautions in which to call 911 and/or go to the emergency room. The patient expresses understanding of the above and displays the capacity to agree with this current plan. All questions were answered.    Case discussed & seen by staff psychiatrist: Dr. Patrick.    Total of 35 minutes spent today on encounter, including chart review,  review, seeing patient, documenting encounter, ordering medications.    Eduardo Baugh MD  [facility], PGY-III  [facility]

## 2025-03-20 ENCOUNTER — PATIENT MESSAGE (OUTPATIENT)
Dept: PSYCHIATRY | Facility: CLINIC | Age: 40
End: 2025-03-20
Payer: COMMERCIAL

## 2025-03-25 DIAGNOSIS — F90.2 ATTENTION DEFICIT HYPERACTIVITY DISORDER (ADHD), COMBINED TYPE: ICD-10-CM

## 2025-03-25 RX ORDER — LISDEXAMFETAMINE DIMESYLATE 40 MG/1
40 CAPSULE ORAL DAILY
Qty: 30 CAPSULE | Refills: 0 | Status: SHIPPED | OUTPATIENT
Start: 2025-03-25 | End: 2025-04-24

## 2025-03-25 RX ORDER — LISDEXAMFETAMINE DIMESYLATE 40 MG/1
40 CAPSULE ORAL DAILY
Qty: 30 CAPSULE | Refills: 0 | Status: SHIPPED | OUTPATIENT
Start: 2025-04-25 | End: 2025-05-25

## 2025-03-31 ENCOUNTER — PATIENT MESSAGE (OUTPATIENT)
Dept: PSYCHIATRY | Facility: CLINIC | Age: 40
End: 2025-03-31
Payer: COMMERCIAL

## 2025-05-26 ENCOUNTER — OFFICE VISIT (OUTPATIENT)
Dept: PSYCHIATRY | Facility: CLINIC | Age: 40
End: 2025-05-26
Payer: COMMERCIAL

## 2025-05-26 VITALS
HEART RATE: 76 BPM | BODY MASS INDEX: 27.93 KG/M2 | WEIGHT: 189.13 LBS | DIASTOLIC BLOOD PRESSURE: 80 MMHG | SYSTOLIC BLOOD PRESSURE: 128 MMHG

## 2025-05-26 DIAGNOSIS — F90.2 ATTENTION DEFICIT HYPERACTIVITY DISORDER (ADHD), COMBINED TYPE: ICD-10-CM

## 2025-05-26 PROCEDURE — 99214 OFFICE O/P EST MOD 30 MIN: CPT | Mod: S$GLB,,, | Performed by: PSYCHIATRY & NEUROLOGY

## 2025-05-26 PROCEDURE — 3074F SYST BP LT 130 MM HG: CPT | Mod: CPTII,S$GLB,, | Performed by: PSYCHIATRY & NEUROLOGY

## 2025-05-26 PROCEDURE — 99999 PR PBB SHADOW E&M-EST. PATIENT-LVL II: CPT | Mod: PBBFAC,,,

## 2025-05-26 PROCEDURE — 3008F BODY MASS INDEX DOCD: CPT | Mod: CPTII,S$GLB,, | Performed by: PSYCHIATRY & NEUROLOGY

## 2025-05-26 PROCEDURE — 3079F DIAST BP 80-89 MM HG: CPT | Mod: CPTII,S$GLB,, | Performed by: PSYCHIATRY & NEUROLOGY

## 2025-05-26 RX ORDER — LISDEXAMFETAMINE DIMESYLATE 40 MG/1
40 CAPSULE ORAL DAILY
Qty: 30 CAPSULE | Refills: 0 | Status: SHIPPED | OUTPATIENT
Start: 2025-05-26 | End: 2025-06-25

## 2025-05-26 RX ORDER — LISDEXAMFETAMINE DIMESYLATE 40 MG/1
40 CAPSULE ORAL DAILY
Qty: 30 CAPSULE | Refills: 0 | Status: SHIPPED | OUTPATIENT
Start: 2025-06-25 | End: 2025-07-25

## 2025-05-26 NOTE — PROGRESS NOTES
I have reviewed the notes, assessments, and/or procedures performed by the resident, I concur with her/his documentation of Jose Frost.  Date of Service: 5/26/2025

## 2025-05-26 NOTE — PROGRESS NOTES
OUTPATIENT PSYCHIATRY Follow-up VISIT    ENCOUNTER DATE:  5/26/2025  SITE:  [facility]  LENGTH OF SESSION:  25 minutes        CHIEF COMPLAINT:   Anxiety and ADHD    HISTORY OF PRESENTING ILLNESS:  Jose Frost is a 40 y.o. male with history of SHERLEY who presents for follow up visit.      History as told by Patient       Says that he did a lot of his own psychoeducation into ADHD (laying out full schedule).  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  Patient says that he is okay with continuing current dose of Vyvanse.  Denies any side effects from the medication.  Says that he went on a vacation to [place] with his wife and enjoyed it very much.  Resident transition discussed.    Pharmacy: [facility]      PSYCHIATRIC REVIEW OF SYMPTOMS: (Is patient experiencing or having changes in any of the following?)    Symptoms of Depression: denies      Symptoms of SHERLEY: excessive anxiety/worry/fear, more days than not, about numerous issues, difficult to control, with restlessness, fatigue, poor concentration, irritability, muscle tension, sleep disturbance; causes functionally impairing distress   Onset was approximately a few months ago. Symptoms have been gradually worsening since that time.   Current symptoms include:        Symptoms of Panic Attacks: denies    Symptoms of evelin or hypomania: denies    Symptoms of psychosis: denies    Symptoms of PTSD: denies    Sleep: early morning awakening for work        Risk Parameters:  Patient reports no suicidal ideation  Patient reports no homicidal ideation  Patient reports no self-injurious behavior  Patient reports no violent behavior    PSYCHIATRIC MED REVIEW    Current psych meds  Medication side effects:  denies  Medication compliance:  yes    Previous psych meds trials      MEDICAL REVIEW OF SYSTEMS:  Complete review of systems performed covering Constitutional,  Cardiovascular, Respiratory, Gastrointestinal, Musculoskeletal, Skin, Neurologic and Endocrine  All systems negative/ except for .    MEDICAL HISTORY:  Past Medical History:   Diagnosis Date    Mild intermittent asthma, uncomplicated        ALL MEDICATIONS:    Current Outpatient Medications:     lisdexamfetamine (VYVANSE) 40 MG Cap, Take 1 capsule (40 mg total) by mouth once daily., Disp: 30 capsule, Rfl: 0    lisdexamfetamine (VYVANSE) 40 MG Cap, Take 1 capsule (40 mg total) by mouth once daily., Disp: 30 capsule, Rfl: 0    multivitamin (THERAGRAN) per tablet, Take 1 tablet by mouth once daily., Disp: , Rfl:     ALLERGIES:  Review of patient's allergies indicates:  No Known Allergies    RELEVANT LABS/STUDIES:    Lab Results   Component Value Date    WBC 5.10 06/24/2024    HGB 13.4 (L) 06/24/2024    HCT 41.5 06/24/2024    MCV 85 06/24/2024     06/24/2024     BMP  Lab Results   Component Value Date     (L) 06/24/2024    K 4.7 06/24/2024     06/24/2024    CO2 25 06/24/2024    BUN 15 06/24/2024    CREATININE 0.9 06/24/2024    CALCIUM 10.0 06/24/2024    ANIONGAP 8 06/24/2024     Lab Results   Component Value Date    ALT 35 06/24/2024    AST 25 06/24/2024    ALKPHOS 76 06/24/2024    BILITOT 0.5 06/24/2024     Lab Results   Component Value Date    TSH 1.716 06/24/2024     Lab Results   Component Value Date    HGBA1C 5.3 06/24/2024         VITALS  Vitals:    05/26/25 1037   BP: 128/80   Pulse: 76   Weight: 85.8 kg (189 lb 2.5 oz)       PHYSICAL EXAM  General: well developed  Neurologic:   Gait: Normal   Psychomotor signs:  No involuntary movements or tremor  AIMS: none    PSYCHIATRIC EXAM:     Mental Status Exam:  Appearance: unremarkable, age appropriate  Behavior/Cooperation: normal, cooperative  Speech: normal tone, normal rate, normal pitch, normal volume  Language: uses words appropriately; NO aphasia or dysarthria  Mood: OK  Affect: appropriate, mood-congruent  Thought Process: normal and  logical  Thought Content: normal, no suicidality, no homicidality, delusions, or paranoia  Level of Consciousness: Alert and Oriented x3  Memory:  Intact  Attention/concentration: appropriate for age/education.   Fund of Knowledge: intact to conversation  Insight:  Intact - patient has awareness of current condition(s)  Judgment: Intact - behavior adequate for circumstances       IMPRESSION:    Jose Frost is a 40 y.o. male with history of Anxiety who presents for initial assessment.    DIAGNOSES:  No diagnosis found.        PLAN:    Continue vyvanse 40 mg daily for ADHD  Will switch to Lexapro in case of failed trial with stimulants   PMDP reviewed: yes, no discrepancies   Lifestyle modifications to reduce symptoms non-pharmacologically (ex: sleep, exercise, diet, etc).  Establishing with psychotherapy  Limit substance use as drugs/alcohol can exacerbate psychiatric symptoms.  Sleep hygiene discussed    RTC 1 month or sooner if needed    Discussed with patient verbal informed consent including: risks and benefits of proposed treatment above vs. alternative treatments vs. no treatment, serious and common side effects of these respective treatments, as well as the inherent unpredictability of individual responses to any treatments, contingency plans if adverse reactions occur, precautions in which to me through Epic MyChart portal or call the clinic, and precautions in which to call 911 and/or go to the emergency room. The patient expresses understanding of the above and displays the capacity to agree with this current plan. All questions were answered.      Total of 35 minutes spent today on encounter, including chart review,  review, seeing patient, documenting encounter, ordering medications.    Eduardo Baugh MD  [facility], PGY-III  [facility]

## 2025-08-07 DIAGNOSIS — F90.2 ATTENTION DEFICIT HYPERACTIVITY DISORDER (ADHD), COMBINED TYPE: ICD-10-CM

## 2025-08-08 ENCOUNTER — PATIENT MESSAGE (OUTPATIENT)
Dept: PSYCHIATRY | Facility: CLINIC | Age: 40
End: 2025-08-08
Payer: COMMERCIAL

## 2025-08-08 RX ORDER — LISDEXAMFETAMINE DIMESYLATE 40 MG/1
40 CAPSULE ORAL DAILY
Qty: 30 CAPSULE | Refills: 0 | Status: SHIPPED | OUTPATIENT
Start: 2025-08-08

## 2025-08-11 DIAGNOSIS — F90.2 ATTENTION DEFICIT HYPERACTIVITY DISORDER (ADHD), COMBINED TYPE: ICD-10-CM

## 2025-08-11 RX ORDER — LISDEXAMFETAMINE DIMESYLATE 40 MG/1
40 CAPSULE ORAL DAILY
Qty: 30 CAPSULE | Refills: 0 | Status: SHIPPED | OUTPATIENT
Start: 2025-08-11

## 2025-08-26 ENCOUNTER — PATIENT MESSAGE (OUTPATIENT)
Dept: PSYCHIATRY | Facility: CLINIC | Age: 40
End: 2025-08-26
Payer: COMMERCIAL

## 2025-08-26 ENCOUNTER — OFFICE VISIT (OUTPATIENT)
Dept: PSYCHIATRY | Facility: CLINIC | Age: 40
End: 2025-08-26
Payer: COMMERCIAL

## 2025-08-26 VITALS
SYSTOLIC BLOOD PRESSURE: 138 MMHG | HEART RATE: 57 BPM | BODY MASS INDEX: 28.49 KG/M2 | DIASTOLIC BLOOD PRESSURE: 82 MMHG | WEIGHT: 192.88 LBS

## 2025-08-26 DIAGNOSIS — F41.1 GAD (GENERALIZED ANXIETY DISORDER): Primary | ICD-10-CM

## 2025-08-26 PROBLEM — F90.2 ATTENTION DEFICIT HYPERACTIVITY DISORDER (ADHD), COMBINED TYPE: Status: RESOLVED | Noted: 2025-02-03 | Resolved: 2025-08-26

## 2025-08-26 PROCEDURE — 3079F DIAST BP 80-89 MM HG: CPT | Mod: CPTII,S$GLB,, | Performed by: PSYCHIATRY & NEUROLOGY

## 2025-08-26 PROCEDURE — 90833 PSYTX W PT W E/M 30 MIN: CPT | Mod: S$GLB,,, | Performed by: PSYCHIATRY & NEUROLOGY

## 2025-08-26 PROCEDURE — 99417 PROLNG OP E/M EACH 15 MIN: CPT | Mod: S$GLB,,, | Performed by: PSYCHIATRY & NEUROLOGY

## 2025-08-26 PROCEDURE — 99215 OFFICE O/P EST HI 40 MIN: CPT | Mod: S$GLB,,, | Performed by: PSYCHIATRY & NEUROLOGY

## 2025-08-26 PROCEDURE — 1160F RVW MEDS BY RX/DR IN RCRD: CPT | Mod: CPTII,S$GLB,, | Performed by: PSYCHIATRY & NEUROLOGY

## 2025-08-26 PROCEDURE — 3008F BODY MASS INDEX DOCD: CPT | Mod: CPTII,S$GLB,, | Performed by: PSYCHIATRY & NEUROLOGY

## 2025-08-26 PROCEDURE — 99999 PR PBB SHADOW E&M-EST. PATIENT-LVL III: CPT | Mod: PBBFAC,,, | Performed by: PSYCHIATRY & NEUROLOGY

## 2025-08-26 PROCEDURE — 1159F MED LIST DOCD IN RCRD: CPT | Mod: CPTII,S$GLB,, | Performed by: PSYCHIATRY & NEUROLOGY

## 2025-08-26 PROCEDURE — 3075F SYST BP GE 130 - 139MM HG: CPT | Mod: CPTII,S$GLB,, | Performed by: PSYCHIATRY & NEUROLOGY

## 2025-08-26 PROCEDURE — G2211 COMPLEX E/M VISIT ADD ON: HCPCS | Mod: S$GLB,,, | Performed by: PSYCHIATRY & NEUROLOGY

## 2025-08-26 RX ORDER — HYDROXYZINE HYDROCHLORIDE 25 MG/1
TABLET, FILM COATED ORAL
Qty: 30 TABLET | Refills: 2 | Status: SHIPPED | OUTPATIENT
Start: 2025-08-26

## 2025-08-26 RX ORDER — SERTRALINE HYDROCHLORIDE 50 MG/1
50 TABLET, FILM COATED ORAL NIGHTLY
Qty: 30 TABLET | Refills: 2 | Status: SHIPPED | OUTPATIENT
Start: 2025-08-26

## 2025-08-28 ENCOUNTER — OFFICE VISIT (OUTPATIENT)
Dept: INTERNAL MEDICINE | Facility: CLINIC | Age: 40
End: 2025-08-28
Payer: COMMERCIAL

## 2025-08-28 ENCOUNTER — LAB VISIT (OUTPATIENT)
Dept: LAB | Facility: HOSPITAL | Age: 40
End: 2025-08-28
Attending: STUDENT IN AN ORGANIZED HEALTH CARE EDUCATION/TRAINING PROGRAM
Payer: COMMERCIAL

## 2025-08-28 VITALS
HEART RATE: 54 BPM | WEIGHT: 189.63 LBS | TEMPERATURE: 98 F | OXYGEN SATURATION: 100 % | HEIGHT: 69 IN | DIASTOLIC BLOOD PRESSURE: 70 MMHG | SYSTOLIC BLOOD PRESSURE: 112 MMHG | BODY MASS INDEX: 28.08 KG/M2

## 2025-08-28 DIAGNOSIS — M54.2 NECK PAIN: ICD-10-CM

## 2025-08-28 DIAGNOSIS — Z00.00 PHYSICAL EXAM, ANNUAL: Primary | ICD-10-CM

## 2025-08-28 DIAGNOSIS — Z00.00 PHYSICAL EXAM, ANNUAL: ICD-10-CM

## 2025-08-28 LAB
25(OH)D3+25(OH)D2 SERPL-MCNC: 26 NG/ML (ref 30–96)
ABSOLUTE EOSINOPHIL (OHS): 0.2 K/UL
ABSOLUTE MONOCYTE (OHS): 0.5 K/UL (ref 0.3–1)
ABSOLUTE NEUTROPHIL COUNT (OHS): 3.11 K/UL (ref 1.8–7.7)
ALBUMIN SERPL BCP-MCNC: 4.4 G/DL (ref 3.5–5.2)
ALP SERPL-CCNC: 89 UNIT/L (ref 40–150)
ALT SERPL W/O P-5'-P-CCNC: 42 UNIT/L (ref 0–55)
ANION GAP (OHS): 11 MMOL/L (ref 8–16)
AST SERPL-CCNC: 43 UNIT/L (ref 0–50)
BASOPHILS # BLD AUTO: 0.05 K/UL
BASOPHILS NFR BLD AUTO: 0.9 %
BILIRUB SERPL-MCNC: 0.3 MG/DL (ref 0.1–1)
BUN SERPL-MCNC: 17 MG/DL (ref 6–20)
CALCIUM SERPL-MCNC: 10 MG/DL (ref 8.7–10.5)
CHLORIDE SERPL-SCNC: 104 MMOL/L (ref 95–110)
CHOLEST SERPL-MCNC: 197 MG/DL (ref 120–199)
CHOLEST/HDLC SERPL: 3.6 {RATIO} (ref 2–5)
CO2 SERPL-SCNC: 23 MMOL/L (ref 23–29)
CREAT SERPL-MCNC: 0.8 MG/DL (ref 0.5–1.4)
ERYTHROCYTE [DISTWIDTH] IN BLOOD BY AUTOMATED COUNT: 12.7 % (ref 11.5–14.5)
GFR SERPLBLD CREATININE-BSD FMLA CKD-EPI: >60 ML/MIN/1.73/M2
GLUCOSE SERPL-MCNC: 87 MG/DL (ref 70–110)
HCT VFR BLD AUTO: 43.1 % (ref 40–54)
HDLC SERPL-MCNC: 54 MG/DL (ref 40–75)
HDLC SERPL: 27.4 % (ref 20–50)
HGB BLD-MCNC: 13.6 GM/DL (ref 14–18)
IMM GRANULOCYTES # BLD AUTO: 0.01 K/UL (ref 0–0.04)
IMM GRANULOCYTES NFR BLD AUTO: 0.2 % (ref 0–0.5)
LDLC SERPL CALC-MCNC: 122 MG/DL (ref 63–159)
LYMPHOCYTES # BLD AUTO: 1.94 K/UL (ref 1–4.8)
MCH RBC QN AUTO: 26.8 PG (ref 27–31)
MCHC RBC AUTO-ENTMCNC: 31.6 G/DL (ref 32–36)
MCV RBC AUTO: 85 FL (ref 82–98)
NONHDLC SERPL-MCNC: 143 MG/DL
NUCLEATED RBC (/100WBC) (OHS): 0 /100 WBC
PLATELET # BLD AUTO: 278 K/UL (ref 150–450)
PMV BLD AUTO: 10 FL (ref 9.2–12.9)
POTASSIUM SERPL-SCNC: 5 MMOL/L (ref 3.5–5.1)
PROT SERPL-MCNC: 8.2 GM/DL (ref 6–8.4)
RBC # BLD AUTO: 5.08 M/UL (ref 4.6–6.2)
RELATIVE EOSINOPHIL (OHS): 3.4 %
RELATIVE LYMPHOCYTE (OHS): 33.4 % (ref 18–48)
RELATIVE MONOCYTE (OHS): 8.6 % (ref 4–15)
RELATIVE NEUTROPHIL (OHS): 53.5 % (ref 38–73)
SODIUM SERPL-SCNC: 138 MMOL/L (ref 136–145)
T4 SERPL-MCNC: 7.4 UG/DL (ref 4.5–11.5)
TRIGL SERPL-MCNC: 105 MG/DL (ref 30–150)
TSH SERPL-ACNC: 1.73 UIU/ML (ref 0.4–4)
WBC # BLD AUTO: 5.81 K/UL (ref 3.9–12.7)

## 2025-08-28 PROCEDURE — 85025 COMPLETE CBC W/AUTO DIFF WBC: CPT

## 2025-08-28 PROCEDURE — 84443 ASSAY THYROID STIM HORMONE: CPT

## 2025-08-28 PROCEDURE — 36415 COLL VENOUS BLD VENIPUNCTURE: CPT | Mod: PO

## 2025-08-28 PROCEDURE — 82306 VITAMIN D 25 HYDROXY: CPT

## 2025-08-28 PROCEDURE — 3008F BODY MASS INDEX DOCD: CPT | Mod: CPTII,S$GLB,, | Performed by: STUDENT IN AN ORGANIZED HEALTH CARE EDUCATION/TRAINING PROGRAM

## 2025-08-28 PROCEDURE — 80061 LIPID PANEL: CPT

## 2025-08-28 PROCEDURE — 99999 PR PBB SHADOW E&M-EST. PATIENT-LVL III: CPT | Mod: PBBFAC,,, | Performed by: STUDENT IN AN ORGANIZED HEALTH CARE EDUCATION/TRAINING PROGRAM

## 2025-08-28 PROCEDURE — 3074F SYST BP LT 130 MM HG: CPT | Mod: CPTII,S$GLB,, | Performed by: STUDENT IN AN ORGANIZED HEALTH CARE EDUCATION/TRAINING PROGRAM

## 2025-08-28 PROCEDURE — 99395 PREV VISIT EST AGE 18-39: CPT | Mod: S$GLB,,, | Performed by: STUDENT IN AN ORGANIZED HEALTH CARE EDUCATION/TRAINING PROGRAM

## 2025-08-28 PROCEDURE — 3078F DIAST BP <80 MM HG: CPT | Mod: CPTII,S$GLB,, | Performed by: STUDENT IN AN ORGANIZED HEALTH CARE EDUCATION/TRAINING PROGRAM

## 2025-08-28 PROCEDURE — 1159F MED LIST DOCD IN RCRD: CPT | Mod: CPTII,S$GLB,, | Performed by: STUDENT IN AN ORGANIZED HEALTH CARE EDUCATION/TRAINING PROGRAM

## 2025-08-28 PROCEDURE — 84436 ASSAY OF TOTAL THYROXINE: CPT

## 2025-08-28 PROCEDURE — 80053 COMPREHEN METABOLIC PANEL: CPT

## (undated) DEVICE — NDL SAFETY 22G X 1.5 ECLIPSE

## (undated) DEVICE — SOL NACL IRR 1000ML BTL

## (undated) DEVICE — GLOVE BIOGEL SKINSENSE PI 7.0

## (undated) DEVICE — DRESSING XEROFORM NONADH 1X8IN

## (undated) DEVICE — COVER CAMERA OPERATING ROOM

## (undated) DEVICE — Device

## (undated) DEVICE — SYR B-D DISP CONTROL 10CC100/C

## (undated) DEVICE — GLOVE SURGEON SYN PF SZ 9

## (undated) DEVICE — GLOVE ORTHO PF SZ 8.5

## (undated) DEVICE — WRAP KNEE ACCU THERM GEL PACK

## (undated) DEVICE — COVER MAYO STAND REINFRCD 30

## (undated) DEVICE — BNDG COFLEX FOAM LF2 ST 6X5YD

## (undated) DEVICE — STRIP MEDI WND CLSR 1/2X4IN

## (undated) DEVICE — PAD ABDOMINAL STERILE 8X10IN

## (undated) DEVICE — ADHESIVE MASTISOL VIAL 48/BX

## (undated) DEVICE — BLADE 4.2MM PREBENT ULTRACUT

## (undated) DEVICE — DRAPE ARTHSCP FLD CTRL POUCH

## (undated) DEVICE — SUT MCRYL PLUS 4-0 PS2 27IN

## (undated) DEVICE — SOL NACL IRR 3000ML

## (undated) DEVICE — GOWN ECLIPSE REINF LV4 XLNG XL

## (undated) DEVICE — PAD CAST SPECIALIST STRL 6

## (undated) DEVICE — PAD ELECTRODE STER 1.5X3

## (undated) DEVICE — GOWN ECLIPSE REINF LV4 TWL 2XL

## (undated) DEVICE — SPONGE COTTON TRAY 4X4IN